# Patient Record
Sex: MALE | Race: WHITE | NOT HISPANIC OR LATINO | Employment: UNEMPLOYED | URBAN - NONMETROPOLITAN AREA
[De-identification: names, ages, dates, MRNs, and addresses within clinical notes are randomized per-mention and may not be internally consistent; named-entity substitution may affect disease eponyms.]

---

## 2018-04-13 ENCOUNTER — HOSPITAL ENCOUNTER (EMERGENCY)
Facility: HOSPITAL | Age: 33
Discharge: HOME | End: 2018-04-13
Attending: EMERGENCY MEDICINE | Admitting: EMERGENCY MEDICINE

## 2018-04-13 VITALS
HEART RATE: 71 BPM | WEIGHT: 190 LBS | OXYGEN SATURATION: 99 % | SYSTOLIC BLOOD PRESSURE: 142 MMHG | TEMPERATURE: 98.6 F | HEIGHT: 72 IN | DIASTOLIC BLOOD PRESSURE: 89 MMHG | BODY MASS INDEX: 25.73 KG/M2 | RESPIRATION RATE: 16 BRPM

## 2018-04-13 DIAGNOSIS — L23.7 POISON IVY DERMATITIS: Primary | ICD-10-CM

## 2018-04-13 PROCEDURE — 25010000002 METHYLPREDNISOLONE PER 125 MG: Performed by: PHYSICIAN ASSISTANT

## 2018-04-13 PROCEDURE — 96372 THER/PROPH/DIAG INJ SC/IM: CPT

## 2018-04-13 PROCEDURE — 99283 EMERGENCY DEPT VISIT LOW MDM: CPT

## 2018-04-13 RX ORDER — METHYLPREDNISOLONE SODIUM SUCCINATE 125 MG/2ML
125 INJECTION, POWDER, LYOPHILIZED, FOR SOLUTION INTRAMUSCULAR; INTRAVENOUS ONCE
Status: COMPLETED | OUTPATIENT
Start: 2018-04-13 | End: 2018-04-13

## 2018-04-13 RX ORDER — HYDROXYZINE HYDROCHLORIDE 25 MG/1
25 TABLET, FILM COATED ORAL EVERY 6 HOURS PRN
Qty: 24 TABLET | Refills: 0 | Status: ON HOLD | OUTPATIENT
Start: 2018-04-13 | End: 2018-06-12

## 2018-04-13 RX ADMIN — METHYLPREDNISOLONE SODIUM SUCCINATE 125 MG: 125 INJECTION, POWDER, FOR SOLUTION INTRAMUSCULAR; INTRAVENOUS at 09:38

## 2018-04-13 NOTE — ED NOTES
Pt drinking water, cool washcloth applied. Respirations even and unlabored. Pa remains at bedside.      Giovana Barber RN  04/13/18 0995

## 2018-04-13 NOTE — ED PROVIDER NOTES
Subjective   33-year-old male presents to the ED today and states that he has poison ivy.  He states he has been outside cutting trees.  He states the rash started about 3 days ago.  He states he has a history of an allergy to poison ivy.  He states he has been using calamine lotion and an over-the-counter poison ivy cream but states the rash continues to get worse.  The rash is itching and burning.  He states he has it all over his body.        History provided by:  Patient  Rash   Location:  Full body  Quality: burning and itchiness    Severity:  Severe  Onset quality:  Gradual  Duration:  3 days  Timing:  Constant  Progression:  Worsening  Chronicity:  New  Context: plant contact    Relieved by:  Nothing  Worsened by:  Nothing  Ineffective treatments: Calamine lotion and poison ivy cream.  Associated symptoms: no abdominal pain, no diarrhea, no fatigue, no fever, no headaches, no hoarse voice, no induration, no joint pain, no myalgias, no nausea, no periorbital edema, no shortness of breath, no sore throat, no throat swelling, no tongue swelling, no URI, not vomiting and not wheezing        Review of Systems   Constitutional: Negative for fatigue and fever.   HENT: Negative for facial swelling, hoarse voice, sore throat, trouble swallowing and voice change.    Eyes: Negative.    Respiratory: Negative for shortness of breath and wheezing.    Cardiovascular: Negative.    Gastrointestinal: Negative for abdominal pain, diarrhea, nausea and vomiting.   Genitourinary: Negative.    Musculoskeletal: Negative for arthralgias and myalgias.   Skin: Positive for rash.   Neurological: Negative for headaches.   Psychiatric/Behavioral: Negative.    All other systems reviewed and are negative.      Past Medical History:   Diagnosis Date   • Anxiety    • Bipolar disorder    • Depression    • PTSD (post-traumatic stress disorder)    • Thyroid disease        Allergies   Allergen Reactions   • Penicillins Anaphylaxis   • Benadryl  [Diphenhydramine] Rash       Past Surgical History:   Procedure Laterality Date   • MOUTH SURGERY         No family history on file.    Social History     Social History   • Marital status: Single     Social History Main Topics   • Smoking status: Current Every Day Smoker     Packs/day: 1.00     Types: Cigarettes   • Drug use: Unknown     Other Topics Concern   • Not on file           Objective   Physical Exam   Constitutional: He is oriented to person, place, and time. He appears well-developed and well-nourished. No distress.   HENT:   Head: Normocephalic and atraumatic.   Right Ear: External ear normal.   Left Ear: External ear normal.   Nose: Nose normal.   Mouth/Throat: Oropharynx is clear and moist. No oropharyngeal exudate.   Eyes: Conjunctivae and EOM are normal. Pupils are equal, round, and reactive to light.   Neck: Normal range of motion. Neck supple.   Cardiovascular: Normal rate, regular rhythm, normal heart sounds and intact distal pulses.    Pulmonary/Chest: Effort normal and breath sounds normal. No stridor. No respiratory distress. He has no wheezes.   Abdominal: Soft. Bowel sounds are normal. There is no tenderness.   Musculoskeletal: Normal range of motion. He exhibits no edema.   Lymphadenopathy:     He has no cervical adenopathy.   Neurological: He is alert and oriented to person, place, and time.   Skin: Skin is warm and dry. Capillary refill takes less than 2 seconds. Rash noted.   Patient has a diffuse rash, papules and small blisters that are weeping.  Areas of excoriation from scratching.   Psychiatric: He has a normal mood and affect. His behavior is normal. Judgment and thought content normal.   Nursing note and vitals reviewed.      Procedures         ED Course  ED Course   Comment By Time   Patient had a vasovagal reaction after receiving IM solu-medrol.  He became diaphoretic and pale.  He was able to drink some water and a cool cloth placed on his forehead.  Vitals remained normal.   He is currently feeling back to his normal self.  He was also prescribed a 2 week taper of Prednisone 20 mg.  He will return to the ED if his symptoms worsen. DEBORAH Crespo 04/13 0956                  MDM  Number of Diagnoses or Management Options  Poison ivy dermatitis:   Patient Progress  Patient progress: stable      Final diagnoses:   Poison ivy dermatitis            DEBORAH Crespo  04/13/18 0916

## 2018-04-13 NOTE — ED NOTES
Pt is pale, diaphoretic, reports sob. Pt led onto stretcher, side rails up x2. Pa at bedside.     Giovana Barber RN  04/13/18 4952

## 2018-04-13 NOTE — DISCHARGE INSTRUCTIONS
Call one of the offices below to establish a primary care provider.  If you are unable to get an appointment and feel it is an emergency and need to be seen immediately please return to the Emergency Department.    Call one of the office below to set up a primary care provider.    Dr. Yung Rios                                                                                                       [address] [facility] [address]  [phone]    Dr. Tanner, Dr. JEFERSON Pitt, Dr. MAYA Pitt ([facility])  [address] [facility] [address]  [phone]    Dr. Marie, Dr. Jones, Dr. Nguyen ([facility])  [address] [facility] [address]  [phone]    Dr. Morillo  [address] [facility] [address]  [phone]    Dr. Sharpe, Dr. Mir, Dr. Gillette, Dr. Puri ([facility])  [address] [place] [address] [address]  [facility] [address]  [phone]    Dr. Ayaka Saucedo  [address] [facility] [place] [address]  [phone]    Dr. Sheri Guzman  [address]   [address]  [facility] [address]  [phone]    Dr. Rios  [address] [facility] [address]  [phone]    Dr. Arias, Dr. Guzman  [address] [facility] [place] [address]  [phone]    Dr. Majano  [address] [facility] [address]                                                              [address]  [facility] [address]  [phone]    Dr. Brown  [address] [facility] [id number] [phone]    Dr. Isabella Landeros  [address]  [address]  [facility] [address]  [phone]

## 2018-04-13 NOTE — ED NOTES
Pt alert and oriented, skin is now pwd, no respiratory distress. Pt reports he feels better. Rash still present and is the same.      Giovana Barber RN  04/13/18 7783

## 2018-06-12 ENCOUNTER — HOSPITAL ENCOUNTER (INPATIENT)
Facility: HOSPITAL | Age: 33
LOS: 2 days | Discharge: HOME | End: 2018-06-14
Attending: PSYCHIATRY & NEUROLOGY | Admitting: PSYCHIATRY & NEUROLOGY

## 2018-06-12 ENCOUNTER — HOSPITAL ENCOUNTER (EMERGENCY)
Facility: HOSPITAL | Age: 33
Discharge: STILL ADMITTED OR ADMINISTRATIVE | End: 2018-06-12
Attending: FAMILY MEDICINE

## 2018-06-12 VITALS
OXYGEN SATURATION: 98 % | DIASTOLIC BLOOD PRESSURE: 83 MMHG | RESPIRATION RATE: 18 BRPM | WEIGHT: 172 LBS | TEMPERATURE: 98.1 F | HEART RATE: 87 BPM | SYSTOLIC BLOOD PRESSURE: 129 MMHG | BODY MASS INDEX: 24.08 KG/M2 | HEIGHT: 71 IN

## 2018-06-12 DIAGNOSIS — R45.851 SUICIDAL IDEATIONS: Primary | ICD-10-CM

## 2018-06-12 DIAGNOSIS — F19.10 POLYSUBSTANCE ABUSE (HCC): ICD-10-CM

## 2018-06-12 PROBLEM — F32.9 MDD (MAJOR DEPRESSIVE DISORDER): Status: ACTIVE | Noted: 2018-06-12

## 2018-06-12 LAB
6-ACETYL MORPHINE: NEGATIVE
ALBUMIN SERPL-MCNC: 4.2 G/DL (ref 3.5–5)
ALBUMIN/GLOB SERPL: 1.4 G/DL (ref 1.5–2.5)
ALP SERPL-CCNC: 126 U/L (ref 40–129)
ALT SERPL W P-5'-P-CCNC: 207 U/L (ref 10–44)
AMPHET+METHAMPHET UR QL: POSITIVE
ANION GAP SERPL CALCULATED.3IONS-SCNC: 2.1 MMOL/L (ref 3.6–11.2)
AST SERPL-CCNC: 130 U/L (ref 10–34)
BARBITURATES UR QL SCN: NEGATIVE
BASOPHILS # BLD AUTO: 0.08 10*3/MM3 (ref 0–0.3)
BASOPHILS NFR BLD AUTO: 0.9 % (ref 0–2)
BENZODIAZ UR QL SCN: NEGATIVE
BILIRUB SERPL-MCNC: 0.9 MG/DL (ref 0.2–1.8)
BILIRUB UR QL STRIP: ABNORMAL
BUN BLD-MCNC: 9 MG/DL (ref 7–21)
BUN/CREAT SERPL: 9.1 (ref 7–25)
BUPRENORPHINE SERPL-MCNC: POSITIVE NG/ML
CALCIUM SPEC-SCNC: 9.1 MG/DL (ref 7.7–10)
CANNABINOIDS SERPL QL: POSITIVE
CHLORIDE SERPL-SCNC: 108 MMOL/L (ref 99–112)
CLARITY UR: CLEAR
CO2 SERPL-SCNC: 29.9 MMOL/L (ref 24.3–31.9)
COCAINE UR QL: NEGATIVE
COLOR UR: ABNORMAL
CREAT BLD-MCNC: 0.99 MG/DL (ref 0.43–1.29)
DEPRECATED RDW RBC AUTO: 46.1 FL (ref 37–54)
EOSINOPHIL # BLD AUTO: 0.34 10*3/MM3 (ref 0–0.7)
EOSINOPHIL NFR BLD AUTO: 3.9 % (ref 0–5)
ERYTHROCYTE [DISTWIDTH] IN BLOOD BY AUTOMATED COUNT: 14.9 % (ref 11.5–14.5)
ETHANOL BLD-MCNC: <10 MG/DL
ETHANOL UR QL: <0.01 %
GFR SERPL CREATININE-BSD FRML MDRD: 87 ML/MIN/1.73
GLOBULIN UR ELPH-MCNC: 3 GM/DL
GLUCOSE BLD-MCNC: 77 MG/DL (ref 70–110)
GLUCOSE UR STRIP-MCNC: NEGATIVE MG/DL
HCT VFR BLD AUTO: 48.6 % (ref 42–52)
HGB BLD-MCNC: 16.3 G/DL (ref 14–18)
HGB UR QL STRIP.AUTO: NEGATIVE
IMM GRANULOCYTES # BLD: 0.02 10*3/MM3 (ref 0–0.03)
IMM GRANULOCYTES NFR BLD: 0.2 % (ref 0–0.5)
KETONES UR QL STRIP: NEGATIVE
LEUKOCYTE ESTERASE UR QL STRIP.AUTO: NEGATIVE
LYMPHOCYTES # BLD AUTO: 3.26 10*3/MM3 (ref 1–3)
LYMPHOCYTES NFR BLD AUTO: 37.5 % (ref 21–51)
MCH RBC QN AUTO: 28.3 PG (ref 27–33)
MCHC RBC AUTO-ENTMCNC: 33.5 G/DL (ref 33–37)
MCV RBC AUTO: 84.4 FL (ref 80–94)
METHADONE UR QL SCN: NEGATIVE
MONOCYTES # BLD AUTO: 1.13 10*3/MM3 (ref 0.1–0.9)
MONOCYTES NFR BLD AUTO: 13 % (ref 0–10)
NEUTROPHILS # BLD AUTO: 3.86 10*3/MM3 (ref 1.4–6.5)
NEUTROPHILS NFR BLD AUTO: 44.5 % (ref 30–70)
NITRITE UR QL STRIP: NEGATIVE
OPIATES UR QL: NEGATIVE
OSMOLALITY SERPL CALC.SUM OF ELEC: 276.9 MOSM/KG (ref 273–305)
OXYCODONE UR QL SCN: NEGATIVE
PCP UR QL SCN: NEGATIVE
PH UR STRIP.AUTO: <=5 [PH] (ref 5–8)
PLATELET # BLD AUTO: 317 10*3/MM3 (ref 130–400)
PMV BLD AUTO: 10.6 FL (ref 6–10)
POTASSIUM BLD-SCNC: 4.2 MMOL/L (ref 3.5–5.3)
PROT SERPL-MCNC: 7.2 G/DL (ref 6–8)
PROT UR QL STRIP: NEGATIVE
RBC # BLD AUTO: 5.76 10*6/MM3 (ref 4.7–6.1)
SODIUM BLD-SCNC: 140 MMOL/L (ref 135–153)
SP GR UR STRIP: >1.03 (ref 1–1.03)
UROBILINOGEN UR QL STRIP: ABNORMAL
WBC NRBC COR # BLD: 8.69 10*3/MM3 (ref 4.5–12.5)

## 2018-06-12 PROCEDURE — 80307 DRUG TEST PRSMV CHEM ANLYZR: CPT | Performed by: PHYSICIAN ASSISTANT

## 2018-06-12 PROCEDURE — 93005 ELECTROCARDIOGRAM TRACING: CPT | Performed by: PSYCHIATRY & NEUROLOGY

## 2018-06-12 PROCEDURE — 80053 COMPREHEN METABOLIC PANEL: CPT | Performed by: PHYSICIAN ASSISTANT

## 2018-06-12 PROCEDURE — 85025 COMPLETE CBC W/AUTO DIFF WBC: CPT | Performed by: PHYSICIAN ASSISTANT

## 2018-06-12 PROCEDURE — HZ2ZZZZ DETOXIFICATION SERVICES FOR SUBSTANCE ABUSE TREATMENT: ICD-10-PCS | Performed by: PSYCHIATRY & NEUROLOGY

## 2018-06-12 PROCEDURE — 81003 URINALYSIS AUTO W/O SCOPE: CPT | Performed by: PHYSICIAN ASSISTANT

## 2018-06-12 RX ORDER — FAMOTIDINE 20 MG/1
20 TABLET, FILM COATED ORAL 2 TIMES DAILY PRN
Status: DISCONTINUED | OUTPATIENT
Start: 2018-06-12 | End: 2018-06-14 | Stop reason: HOSPADM

## 2018-06-12 RX ORDER — HYDROXYZINE 50 MG/1
50 TABLET, FILM COATED ORAL EVERY 6 HOURS PRN
Status: DISCONTINUED | OUTPATIENT
Start: 2018-06-12 | End: 2018-06-14 | Stop reason: HOSPADM

## 2018-06-12 RX ORDER — ECHINACEA PURPUREA EXTRACT 125 MG
2 TABLET ORAL AS NEEDED
Status: DISCONTINUED | OUTPATIENT
Start: 2018-06-12 | End: 2018-06-14 | Stop reason: HOSPADM

## 2018-06-12 RX ORDER — TRAZODONE HYDROCHLORIDE 50 MG/1
50 TABLET ORAL NIGHTLY PRN
Status: DISCONTINUED | OUTPATIENT
Start: 2018-06-12 | End: 2018-06-14 | Stop reason: HOSPADM

## 2018-06-12 RX ORDER — BENZTROPINE MESYLATE 1 MG/1
1 TABLET ORAL DAILY PRN
Status: DISCONTINUED | OUTPATIENT
Start: 2018-06-12 | End: 2018-06-14 | Stop reason: HOSPADM

## 2018-06-12 RX ORDER — IBUPROFEN 600 MG/1
600 TABLET ORAL EVERY 6 HOURS PRN
Status: DISCONTINUED | OUTPATIENT
Start: 2018-06-12 | End: 2018-06-14 | Stop reason: HOSPADM

## 2018-06-12 RX ORDER — LOPERAMIDE HYDROCHLORIDE 2 MG/1
2 CAPSULE ORAL 4 TIMES DAILY PRN
Status: DISCONTINUED | OUTPATIENT
Start: 2018-06-12 | End: 2018-06-14 | Stop reason: HOSPADM

## 2018-06-12 RX ORDER — BENZTROPINE MESYLATE 1 MG/ML
0.5 INJECTION INTRAMUSCULAR; INTRAVENOUS DAILY PRN
Status: DISCONTINUED | OUTPATIENT
Start: 2018-06-12 | End: 2018-06-14 | Stop reason: HOSPADM

## 2018-06-12 RX ORDER — BENZONATATE 100 MG/1
100 CAPSULE ORAL 3 TIMES DAILY PRN
Status: DISCONTINUED | OUTPATIENT
Start: 2018-06-12 | End: 2018-06-14 | Stop reason: HOSPADM

## 2018-06-12 RX ORDER — ALUMINA, MAGNESIA, AND SIMETHICONE 2400; 2400; 240 MG/30ML; MG/30ML; MG/30ML
15 SUSPENSION ORAL EVERY 6 HOURS PRN
Status: DISCONTINUED | OUTPATIENT
Start: 2018-06-12 | End: 2018-06-14 | Stop reason: HOSPADM

## 2018-06-12 RX ORDER — ONDANSETRON 4 MG/1
4 TABLET, FILM COATED ORAL EVERY 6 HOURS PRN
Status: DISCONTINUED | OUTPATIENT
Start: 2018-06-12 | End: 2018-06-14 | Stop reason: HOSPADM

## 2018-06-12 RX ORDER — NICOTINE 21 MG/24HR
1 PATCH, TRANSDERMAL 24 HOURS TRANSDERMAL EVERY 24 HOURS
Status: DISCONTINUED | OUTPATIENT
Start: 2018-06-13 | End: 2018-06-14 | Stop reason: HOSPADM

## 2018-06-12 NOTE — ED PROVIDER NOTES
Subjective   Suicidal for 2 days   Detox from benzos meth and suboxone         History provided by:  Patient   used: No    Mental Health Problem   Presenting symptoms: suicidal thoughts    Degree of incapacity (severity):  Moderate  Onset quality:  Sudden  Duration:  2 days  Timing:  Constant  Progression:  Worsening  Chronicity:  New  Context: drug abuse    Time since last psychoactive medication taken:  2 days  Worsened by:  Drugs  Associated symptoms: feelings of worthlessness    Associated symptoms: no anxiety, no chest pain and no headaches    Risk factors: hx of mental illness        Review of Systems   Constitutional: Negative for chills and fever.   HENT: Negative for congestion, ear pain and sore throat.    Respiratory: Negative for cough, shortness of breath and wheezing.    Cardiovascular: Negative for chest pain.   Gastrointestinal: Negative for diarrhea, nausea and vomiting.   Genitourinary: Negative for dysuria and flank pain.   Skin: Negative for rash.   Neurological: Negative for headaches.   Psychiatric/Behavioral: Positive for dysphoric mood and suicidal ideas. The patient is not nervous/anxious.    All other systems reviewed and are negative.      Past Medical History:   Diagnosis Date   • Anxiety    • Bipolar disorder    • Depression    • Hyperthyroidism    • Liver disease    • PTSD (post-traumatic stress disorder)    • Substance abuse    • Suicidal thoughts    • Suicide attempt    • Thyroid disease        Allergies   Allergen Reactions   • Penicillins Anaphylaxis and Rash   • Benadryl [Diphenhydramine] Rash       Past Surgical History:   Procedure Laterality Date   • MOUTH SURGERY         Family History   Problem Relation Age of Onset   • Alcohol abuse Father    • No Known Problems Mother    • No Known Problems Sister    • No Known Problems Brother    • No Known Problems Sister    • No Known Problems Sister    • No Known Problems Brother    • No Known Problems Brother         Social History     Social History   • Marital status: Single     Social History Main Topics   • Smoking status: Current Every Day Smoker     Packs/day: 1.00     Years: 22.00     Types: Cigarettes   • Smokeless tobacco: Never Used   • Alcohol use No   • Drug use: Yes     Types: Benzodiazepines, Methamphetamines, Marijuana, Other      Comment: states uses benzos, eith pepe, xanax, or klonopin , whatever available; 3-4 x wk po ; states today he took 2,  1 mg klonopins   • Sexual activity: Defer     Other Topics Concern   • Not on file           Objective   Physical Exam   Constitutional: He is oriented to person, place, and time. He appears well-developed and well-nourished.   HENT:   Head: Normocephalic.   Mouth/Throat: Oropharynx is clear and moist.   Neck: Neck supple.   Cardiovascular: Normal rate and regular rhythm.    Pulmonary/Chest: Effort normal and breath sounds normal.   Abdominal: Soft. Bowel sounds are normal. There is no tenderness.   Musculoskeletal: Normal range of motion.   Neurological: He is alert and oriented to person, place, and time.   Skin: Skin is warm and dry.   Psychiatric: His behavior is normal. Judgment normal. He exhibits a depressed mood. He expresses suicidal ideation.   Nursing note and vitals reviewed.      Procedures           ED Course  ED Course as of Chetan 15 0023   Tue Jun 12, 2018 2003 Report given to ivan   [DREW]   2242 Assumed care from PEDRO Newell - will admit to psych  [ML]      ED Course User Index  [LC] DEBORAH De La Rosa  [ML] DEBORAH Johnson                  [facility]      Final diagnoses:   Suicidal ideations   Polysubstance abuse            DEBORAH De La Rosa  06/15/18 0023

## 2018-06-13 PROBLEM — F11.23 OPIOID DEPENDENCE WITH WITHDRAWAL: Status: ACTIVE | Noted: 2018-06-13

## 2018-06-13 PROBLEM — F15.20 METHAMPHETAMINE USE DISORDER, MODERATE (HCC): Status: ACTIVE | Noted: 2018-06-13

## 2018-06-13 PROBLEM — F33.2 SEVERE EPISODE OF RECURRENT MAJOR DEPRESSIVE DISORDER, WITHOUT PSYCHOTIC FEATURES: Status: ACTIVE | Noted: 2018-06-12

## 2018-06-13 PROBLEM — F43.10 POST TRAUMATIC STRESS DISORDER (PTSD): Status: ACTIVE | Noted: 2018-06-13

## 2018-06-13 LAB
HAV IGM SERPL QL IA: ABNORMAL
HBV CORE IGM SERPL QL IA: ABNORMAL
HBV SURFACE AG SERPL QL IA: ABNORMAL
HCV AB SER DONR QL: REACTIVE
HIV1+2 AB SER QL: NORMAL

## 2018-06-13 PROCEDURE — 99223 1ST HOSP IP/OBS HIGH 75: CPT | Performed by: PSYCHIATRY & NEUROLOGY

## 2018-06-13 PROCEDURE — 80074 ACUTE HEPATITIS PANEL: CPT | Performed by: PSYCHIATRY & NEUROLOGY

## 2018-06-13 PROCEDURE — 93010 ELECTROCARDIOGRAM REPORT: CPT | Performed by: INTERNAL MEDICINE

## 2018-06-13 PROCEDURE — G0432 EIA HIV-1/HIV-2 SCREEN: HCPCS | Performed by: PSYCHIATRY & NEUROLOGY

## 2018-06-13 RX ORDER — RISPERIDONE 1 MG/1
1 TABLET ORAL EVERY 12 HOURS SCHEDULED
Status: DISCONTINUED | OUTPATIENT
Start: 2018-06-13 | End: 2018-06-14 | Stop reason: HOSPADM

## 2018-06-13 RX ORDER — PAROXETINE HYDROCHLORIDE 20 MG/1
10 TABLET, FILM COATED ORAL DAILY
Status: DISCONTINUED | OUTPATIENT
Start: 2018-06-13 | End: 2018-06-14 | Stop reason: HOSPADM

## 2018-06-13 RX ORDER — BUPRENORPHINE 2 MG/1
4 TABLET SUBLINGUAL ONCE
Status: COMPLETED | OUTPATIENT
Start: 2018-06-13 | End: 2018-06-13

## 2018-06-13 RX ORDER — LORAZEPAM 1 MG/1
1 TABLET ORAL ONCE
Status: DISCONTINUED | OUTPATIENT
Start: 2018-06-13 | End: 2018-06-13

## 2018-06-13 RX ORDER — BUSPIRONE HYDROCHLORIDE 5 MG/1
5 TABLET ORAL EVERY 12 HOURS SCHEDULED
Status: DISCONTINUED | OUTPATIENT
Start: 2018-06-13 | End: 2018-06-14 | Stop reason: HOSPADM

## 2018-06-13 RX ORDER — BUPRENORPHINE 2 MG/1
2 TABLET SUBLINGUAL ONCE
Status: COMPLETED | OUTPATIENT
Start: 2018-06-14 | End: 2018-06-14

## 2018-06-13 RX ADMIN — HYDROXYZINE HYDROCHLORIDE 50 MG: 50 TABLET ORAL at 21:25

## 2018-06-13 RX ADMIN — PAROXETINE HYDROCHLORIDE 10 MG: 20 TABLET, FILM COATED ORAL at 16:57

## 2018-06-13 RX ADMIN — BUPRENORPHINE HCL 4 MG: 2 TABLET SUBLINGUAL at 16:57

## 2018-06-13 RX ADMIN — TRAZODONE HYDROCHLORIDE 50 MG: 50 TABLET ORAL at 21:25

## 2018-06-13 RX ADMIN — RISPERIDONE 1 MG: 1 TABLET, FILM COATED ORAL at 21:25

## 2018-06-13 RX ADMIN — BUSPIRONE HYDROCHLORIDE 5 MG: 5 TABLET ORAL at 21:25

## 2018-06-13 NOTE — PLAN OF CARE
Problem: Patient Care Overview  Goal: Plan of Care Review  Outcome: Ongoing (interventions implemented as appropriate)  Pt irritable and agitated today. Pt reports poor sleep and fair appetite. Pt has isolated himself in his room most of the day. Rates A/D 10/10. Denies SI/HI or hallucinations. Reports feeling helpless,hopeless and worthless.   06/13/18 1642   Coping/Psychosocial   Plan of Care Reviewed With patient   Coping/Psychosocial   Patient Agreement with Plan of Care agrees   Plan of Care Review   Progress no change     Goal: Individualization and Mutuality  Outcome: Ongoing (interventions implemented as appropriate)    Goal: Discharge Needs Assessment  Outcome: Ongoing (interventions implemented as appropriate)    Goal: Interprofessional Rounds/Family Conf  Outcome: Ongoing (interventions implemented as appropriate)      Problem: Overarching Goals (Adult)  Goal: Adheres to Safety Considerations for Self and Others  Outcome: Ongoing (interventions implemented as appropriate)    Goal: Optimized Coping Skills in Response to Life Stressors  Outcome: Ongoing (interventions implemented as appropriate)    Goal: Develops/Participates in Therapeutic [place] to Support Successful Transition  Outcome: Ongoing (interventions implemented as appropriate)
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
## 2018-06-13 NOTE — ED PROVIDER NOTES
Subjective   History of Present Illness    Review of Systems    Past Medical History:   Diagnosis Date   • Anxiety    • Bipolar disorder    • Depression    • Hyperthyroidism    • Liver disease    • PTSD (post-traumatic stress disorder)    • Substance abuse    • Suicide attempt    • Thyroid disease        Allergies   Allergen Reactions   • Penicillins Anaphylaxis and Rash   • Benadryl [Diphenhydramine] Rash       Past Surgical History:   Procedure Laterality Date   • MOUTH SURGERY         Family History   Problem Relation Age of Onset   • Alcohol abuse Father        Social History     Social History   • Marital status: Single     Social History Main Topics   • Smoking status: Current Every Day Smoker     Packs/day: 1.00     Types: Cigarettes   • Drug use: Yes     Types: Benzodiazepines, Methamphetamines, Marijuana, Other      Comment: states uses benzos, eith pepe, xanax, or klonopin , whatever available; 3-4 x wk po ; states today he took 2,  1 mg klonopins   • Sexual activity: Defer     Other Topics Concern   • Not on file           Objective   Physical Exam    Procedures           ED Course  ED Course as of Jun 12 2242   Tue Jun 12, 2018 2003 Report given to ivan   [LC]   2242 Assumed care from PEDRO Newell - will admit to psych  [ML]      ED Course User Index  [LC] DEBORAH De La Rosa  [ML] DEBORAH Johnson                  [facility]      Final diagnoses:   Suicidal ideations   Polysubstance abuse            DEBORAH Johnson  06/12/18 2242

## 2018-06-13 NOTE — PLAN OF CARE
Problem: Patient Care Overview  Goal: Plan of Care Review  Outcome: Ongoing (interventions implemented as appropriate)   06/13/18 0100   Coping/Psychosocial   Plan of Care Reviewed With patient   Coping/Psychosocial   Patient Agreement with Plan of Care agrees   Plan of Care Review   Progress no change       Problem: Overarching Goals (Adult)  Goal: Adheres to Safety Considerations for Self and Others  Outcome: Ongoing (interventions implemented as appropriate)    Goal: Optimized Coping Skills in Response to Life Stressors  Outcome: Ongoing (interventions implemented as appropriate)    Goal: Develops/Participates in Therapeutic [place] to Support Successful Transition  Outcome: Ongoing (interventions implemented as appropriate)
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
## 2018-06-13 NOTE — PROGRESS NOTES
Navigator is assisting with discharge planning. Provided patient with a list of suboxone clinics in the [facility]. The clinics usually require patient to call to complete assessment before scheduling appt.

## 2018-06-14 VITALS
DIASTOLIC BLOOD PRESSURE: 58 MMHG | RESPIRATION RATE: 18 BRPM | WEIGHT: 169.2 LBS | HEART RATE: 83 BPM | BODY MASS INDEX: 25.06 KG/M2 | SYSTOLIC BLOOD PRESSURE: 126 MMHG | OXYGEN SATURATION: 99 % | TEMPERATURE: 97.4 F | HEIGHT: 69 IN

## 2018-06-14 PROCEDURE — 99238 HOSP IP/OBS DSCHRG MGMT 30/<: CPT | Performed by: PSYCHIATRY & NEUROLOGY

## 2018-06-14 RX ORDER — TRIHEXYPHENIDYL HYDROCHLORIDE 2 MG/1
2 TABLET ORAL 2 TIMES DAILY WITH MEALS
Qty: 60 TABLET | Refills: 0 | Status: ON HOLD | OUTPATIENT
Start: 2018-06-14 | End: 2022-05-05

## 2018-06-14 RX ORDER — ROPINIROLE 0.25 MG/1
0.25 TABLET, FILM COATED ORAL 3 TIMES DAILY
Qty: 21 TABLET | Refills: 0 | Status: SHIPPED | OUTPATIENT
Start: 2018-06-14 | End: 2018-06-21

## 2018-06-14 RX ORDER — RISPERIDONE 1 MG/1
1 TABLET ORAL EVERY 12 HOURS SCHEDULED
Qty: 30 TABLET | Refills: 0 | Status: ON HOLD | OUTPATIENT
Start: 2018-06-14 | End: 2022-05-05

## 2018-06-14 RX ORDER — PAROXETINE HYDROCHLORIDE 20 MG/1
20 TABLET, FILM COATED ORAL DAILY
Qty: 30 TABLET | Refills: 0 | Status: ON HOLD | OUTPATIENT
Start: 2018-06-15 | End: 2022-05-05

## 2018-06-14 RX ORDER — CYCLOBENZAPRINE HCL 10 MG
10 TABLET ORAL EVERY 8 HOURS PRN
Qty: 21 TABLET | Refills: 0 | Status: SHIPPED | OUTPATIENT
Start: 2018-06-14 | End: 2018-06-21

## 2018-06-14 RX ORDER — BUSPIRONE HYDROCHLORIDE 10 MG/1
10 TABLET ORAL EVERY 12 HOURS SCHEDULED
Qty: 60 TABLET | Refills: 0 | Status: ON HOLD | OUTPATIENT
Start: 2018-06-14 | End: 2022-05-05

## 2018-06-14 RX ADMIN — PAROXETINE HYDROCHLORIDE 10 MG: 20 TABLET, FILM COATED ORAL at 09:53

## 2018-06-14 RX ADMIN — RISPERIDONE 1 MG: 1 TABLET, FILM COATED ORAL at 09:53

## 2018-06-14 RX ADMIN — BUSPIRONE HYDROCHLORIDE 5 MG: 5 TABLET ORAL at 09:53

## 2018-06-14 RX ADMIN — BUPRENORPHINE HCL 2 MG: 2 TABLET SUBLINGUAL at 09:53

## 2018-06-14 NOTE — DISCHARGE INSTR - APPOINTMENTS
Please attend: [facility] [address] [place] [address]                           Phone: [phone]    You have an appointment on Friday June 15th, 2018 at 10:00 for intake.

## 2018-06-14 NOTE — DISCHARGE SUMMARY
PSYCHIATRIC DISCHARGE SUMMARY     Patient Identification:  Name:  aJbari Telles  Age:  33 y.o.  Sex:  male  :  1985  MRN:  [id number]  Visit Number:  [id number]      Date of Admission:2018   Date of Discharge:  2018    Discharge Diagnosis:  Principal Problem:    Severe episode of recurrent major depressive disorder, without psychotic features  Active Problems:    Post traumatic stress disorder (PTSD)    Opioid dependence with withdrawal    Methamphetamine use disorder, moderate        Admission Diagnosis:  MDD (major depressive disorder) [F32.9]     Hospital Course  Patient is a 33 y.o. male presented with suicidal thoughts with a plan to hang himself.  The patient was also in acute withdrawal from opioids and methamphetamines.  He was admitted for safety and stabilization.  Initially on the unit he was very irritable and initially requested to leave.  However after addressing his acute opioid withdrawal with a short Subutex taper and supportive medications, the patient mood had improved significantly.  The patient had a history of being on Paxil, BuSpar, risperidone and Artane while incarcerated and found these very helpful.  I agreed to restart them.  He was started on Paxil 10 mg daily, BuSpar 5 mg twice a day, risperidone 1 mg twice a day.  On the day of discharge Artane was added along with increased doses of Paxil and BuSpar.  On the second hospital day, the patient reported resolution of withdrawal symptoms.  We discussed that he likely would have continued withdrawal symptoms at discharge since he received Subutex on the unit.  He was given a 7 day supply of Flexeril and Requip to help with opioid withdrawal symptoms over the next several days.  The patient did not act out aggressively nor engage in any self-harm behaviors on the unit.  He was felt stable for discharge.  Safety planning was completed with his cousin with whom he lives prior to discharge.    Mental Status Exam upon  "discharge:   Mood \"better\"   Affect-congruent, appropriate, stable  Thought Content-goal directed, no delusional material present  Thought process-linear, organized.  Suicidality: No SI  Homicidality: No HI  Perception: No AH/VH    Procedures Performed         Consults:   Consults     No orders found from 5/14/2018 to 6/13/2018.          Pertinent Test Results:   Results for EVELIO RICE (MRN [id number]) as of 6/14/2018 11:33   Ref. Range 6/12/2018 20:14 6/12/2018 20:15 6/13/2018 01:16 6/13/2018 05:06   Glucose Latest Ref Range: 70 - 110 mg/dL 77      Sodium Latest Ref Range: 135 - 153 mmol/L 140      Potassium Latest Ref Range: 3.5 - 5.3 mmol/L 4.2      CO2 Latest Ref Range: 24.3 - 31.9 mmol/L 29.9      Chloride Latest Ref Range: 99 - 112 mmol/L 108      Anion Gap Latest Ref Range: 3.6 - 11.2 mmol/L 2.1 (L)      Creatinine Latest Ref Range: 0.43 - 1.29 mg/dL 0.99      BUN Latest Ref Range: 7 - 21 mg/dL 9      BUN/Creatinine Ratio Latest Ref Range: 7.0 - 25.0  9.1      Calcium Latest Ref Range: 7.7 - 10.0 mg/dL 9.1      eGFR Non African Amer Latest Ref Range: >60 mL/min/1.73 87      Alkaline Phosphatase Latest Ref Range: 40 - 129 U/L 126      Total Protein Latest Ref Range: 6.0 - 8.0 g/dL 7.2      ALT (SGPT) Latest Ref Range: 10 - 44 U/L 207 (H)      AST (SGOT) Latest Ref Range: 10 - 34 U/L 130 (H)      Total Bilirubin Latest Ref Range: 0.2 - 1.8 mg/dL 0.9      Albumin Latest Ref Range: 3.50 - 5.00 g/dL 4.20      Globulin Latest Units: gm/dL 3.0      A/G Ratio Latest Ref Range: 1.5 - 2.5 g/dL 1.4 (L)      Osmolality Calc Latest Ref Range: 273.0 - 305.0 mOsm/kg 276.9      WBC Latest Ref Range: 4.50 - 12.50 10*3/mm3 8.69      RBC Latest Ref Range: 4.70 - 6.10 10*6/mm3 5.76      Hemoglobin Latest Ref Range: 14.0 - 18.0 g/dL 16.3      Hematocrit Latest Ref Range: 42.0 - 52.0 % 48.6      RDW Latest Ref Range: 11.5 - 14.5 % 14.9 (H)      MCV Latest Ref Range: 80.0 - 94.0 fL 84.4      MCH Latest Ref Range: 27.0 - 33.0 pg " 28.3      MCHC Latest Ref Range: 33.0 - 37.0 g/dL 33.5      MPV Latest Ref Range: 6.0 - 10.0 fL 10.6 (H)      Platelets Latest Ref Range: 130 - 400 10*3/mm3 317      RDW-SD Latest Ref Range: 37.0 - 54.0 fl 46.1      Neutrophil % Latest Ref Range: 30.0 - 70.0 % 44.5      Lymphocyte % Latest Ref Range: 21.0 - 51.0 % 37.5      Monocyte % Latest Ref Range: 0.0 - 10.0 % 13.0 (H)      Eosinophil % Latest Ref Range: 0.0 - 5.0 % 3.9      Basophil % Latest Ref Range: 0.0 - 2.0 % 0.9      Immature Grans % Latest Ref Range: 0.0 - 0.5 % 0.2      Neutrophils, Absolute Latest Ref Range: 1.40 - 6.50 10*3/mm3 3.86      Lymphocytes, Absolute Latest Ref Range: 1.00 - 3.00 10*3/mm3 3.26 (H)      Monocytes, Absolute Latest Ref Range: 0.10 - 0.90 10*3/mm3 1.13 (H)      Eosinophils, Absolute Latest Ref Range: 0.00 - 0.70 10*3/mm3 0.34      Basophils, Absolute Latest Ref Range: 0.00 - 0.30 10*3/mm3 0.08      Immature Grans, Absolute Latest Ref Range: 0.00 - 0.03 10*3/mm3 0.02      Hep A IgM Latest Ref Range: Non-Reactive     Non-Reactive   Hepatitis B Surface Ag Latest Ref Range: Non-Reactive     Non-Reactive   Hep B C IgM Latest Ref Range: Non-Reactive     Non-Reactive   Hepatitis C Ab Latest Ref Range: Non-Reactive     Reactive (A)   HIV-1/ HIV-2 Latest Ref Range: Non-Reactive     Non-Reactive   Color, UA Latest Ref Range: Yellow, Straw   Dark Yellow (A)     Appearance, UA Latest Ref Range: Clear   Clear     Specific [place], UA Latest Ref Range: 1.005 - 1.030   >1.030 (H)     pH, UA Latest Ref Range: 5.0 - 8.0   <=5.0     Glucose, UA Latest Ref Range: Negative   Negative     Ketones, UA Latest Ref Range: Negative   Negative     Blood, UA Latest Ref Range: Negative   Negative     Nitrite, UA Latest Ref Range: Negative   Negative     Leuk Esterase, UA Latest Ref Range: Negative   Negative     Protein, UA Latest Ref Range: Negative   Negative     Bilirubin, UA Latest Ref Range: Negative   Small (1+) (A)     Urobilinogen, UA Latest Ref  Range: 0.2 - 1.0 E.U./dL   1.0 E.U./dL     Ethanol % Latest Units: % <0.010      Ethanol Latest Ref Range: <=10 mg/dL <10      6-ACETYL MORPHINE Latest Ref Range: Negative   Negative     Amphetamine Screen, Urine Latest Ref Range: Negative   Positive (A)     Barbiturates Screen, Urine Latest Ref Range: Negative   Negative     Benzodiazepine Screen, Urine Latest Ref Range: Negative   Negative     Buprenorphine, Screen, Urine Latest Ref Range: Negative   Positive (A)     Cocaine Screen, Urine Latest Ref Range: Negative   Negative     Methadone Screen , Urine Latest Ref Range: Negative   Negative     Opiate Screen, Urine Latest Ref Range: Negative   Negative     Oxycodone Screen, Urine Latest Ref Range: Negative   Negative     Phencyclidine (PCP), Urine Latest Ref Range: Negative   Negative     THC Screen, Urine Latest Ref Range: Negative   Positive (A)       Condition on Discharge:  stable    Vital Signs  Temp:  [97.3 °F (36.3 °C)-98.2 °F (36.8 °C)] 97.4 °F (36.3 °C)  Heart Rate:  [48-67] 62  Resp:  [16-20] 18  BP: ()/(50-78) 122/66      Discharge Disposition:  Home or Self Care    Discharge Medications:     Discharge Medications      New Medications      Instructions Start Date   busPIRone 10 MG tablet  Commonly known as:  BUSPAR   10 mg, Oral, Every 12 Hours Scheduled      cyclobenzaprine 10 MG tablet  Commonly known as:  FLEXERIL   10 mg, Oral, Every 8 Hours PRN      PARoxetine 20 MG tablet  Commonly known as:  PAXIL   20 mg, Oral, Daily   Start Date:  6/15/2018     risperiDONE 1 MG tablet  Commonly known as:  risperDAL   1 mg, Oral, Every 12 Hours Scheduled      rOPINIRole 0.25 MG tablet  Commonly known as:  REQUIP   0.25 mg, Oral, 3 Times Daily, Take 1 hour before bedtime.      trihexyphenidyl 2 MG tablet  Commonly known as:  ARTANE   2 mg, Oral, 2 Times Daily With Meals             Discharge Diet: regular     Activity at Discharge: as tolerated    Follow-up Appointments  Comp care in [facility]  Results Pending at Discharge      Clinician:   Orestes Fortune MD  06/14/18  11:32 AM

## 2018-06-14 NOTE — PLAN OF CARE
Problem: Patient Care Overview  Goal: Interprofessional Rounds/Family Conf  Outcome: Outcome(s) achieved Date Met: 06/14/18 06/14/18 1214   Interdisciplinary Rounds/Family Conf   Summary Discussed patient with Dr Fortune this morning, met with patient and contacted family.   Interdisciplinary Rounds/Family Conf   Participants family;social work;patient;psychiatrist     DATA: Discussed with Dr. Fortune that patient is requesting to discharge today.  Discussed that patient appears stable for discharge.  Discussed that patient will follow up with [facility].  Patient was agreeable to this plan and signed consent for [facility].  Met with patient and conducted phone family session with patient and his cousin Tomasa.  She reported that patient will not have access to firearms in the home and will be safe in her home.  She reported that she is on her way and will stop to pick patient up.  Patient verbalized understanding of the importance of aftercare and is agreeable to attend [facility].    ASSESSMENT:  Patient denies suicidal ideation today and denies homicidal ideation today. Patient reports feeling much better today with decreased depression and anxiety.  Patients family appears concerned and supportive.    PLAN:  Patient will discharge home with his cousin today and will follow up with [facility] in [place] tomorrow.

## 2018-06-14 NOTE — PLAN OF CARE
Problem: Patient Care Overview  Goal: Plan of Care Review  Outcome: Ongoing (interventions implemented as appropriate)   06/14/18 0459   Coping/Psychosocial   Plan of Care Reviewed With patient   Coping/Psychosocial   Patient Agreement with Plan of Care agrees   Plan of Care Review   Progress improving   OTHER   Outcome Summary Pt. is stable and slept all night. Reports anx/depr 5/5. Craving 0. WD symptoms anxiety and depression. Cooperative but withdrawn. Denies hallucinations. Denies SI and HI.

## 2018-10-19 ENCOUNTER — APPOINTMENT (OUTPATIENT)
Dept: CT IMAGING | Facility: HOSPITAL | Age: 33
End: 2018-10-19

## 2018-10-19 ENCOUNTER — HOSPITAL ENCOUNTER (EMERGENCY)
Facility: HOSPITAL | Age: 33
Discharge: HOME | End: 2018-10-19
Attending: EMERGENCY MEDICINE

## 2018-10-19 VITALS
DIASTOLIC BLOOD PRESSURE: 80 MMHG | WEIGHT: 180 LBS | RESPIRATION RATE: 18 BRPM | HEART RATE: 97 BPM | TEMPERATURE: 98.2 F | BODY MASS INDEX: 25.77 KG/M2 | HEIGHT: 70 IN | OXYGEN SATURATION: 98 % | SYSTOLIC BLOOD PRESSURE: 136 MMHG

## 2018-10-19 DIAGNOSIS — K52.9 GASTROENTERITIS: Primary | ICD-10-CM

## 2018-10-19 DIAGNOSIS — N39.0 ACUTE UTI: ICD-10-CM

## 2018-10-19 LAB
6-ACETYL MORPHINE: NEGATIVE
ALBUMIN SERPL-MCNC: 4.9 G/DL (ref 3.5–5)
ALBUMIN/GLOB SERPL: 1.5 G/DL (ref 1.5–2.5)
ALP SERPL-CCNC: 122 U/L (ref 40–129)
ALT SERPL W P-5'-P-CCNC: 87 U/L (ref 10–44)
AMPHET+METHAMPHET UR QL: POSITIVE
ANION GAP SERPL CALCULATED.3IONS-SCNC: 4.4 MMOL/L (ref 3.6–11.2)
AST SERPL-CCNC: 45 U/L (ref 10–34)
BACTERIA UR QL AUTO: ABNORMAL /HPF
BARBITURATES UR QL SCN: NEGATIVE
BASOPHILS # BLD AUTO: 0.09 10*3/MM3 (ref 0–0.3)
BASOPHILS NFR BLD AUTO: 0.4 % (ref 0–2)
BENZODIAZ UR QL SCN: NEGATIVE
BILIRUB SERPL-MCNC: 1.2 MG/DL (ref 0.2–1.8)
BILIRUB UR QL STRIP: ABNORMAL
BUN BLD-MCNC: 17 MG/DL (ref 7–21)
BUN/CREAT SERPL: 14.7 (ref 7–25)
BUPRENORPHINE SERPL-MCNC: POSITIVE NG/ML
CALCIUM SPEC-SCNC: 9.9 MG/DL (ref 7.7–10)
CANNABINOIDS SERPL QL: NEGATIVE
CHLORIDE SERPL-SCNC: 104 MMOL/L (ref 99–112)
CLARITY UR: CLEAR
CO2 SERPL-SCNC: 28.6 MMOL/L (ref 24.3–31.9)
COCAINE UR QL: NEGATIVE
COLOR UR: ABNORMAL
CREAT BLD-MCNC: 1.16 MG/DL (ref 0.43–1.29)
D-LACTATE SERPL-SCNC: 1.8 MMOL/L (ref 0.5–2)
DEPRECATED RDW RBC AUTO: 46.1 FL (ref 37–54)
EOSINOPHIL # BLD AUTO: 0.11 10*3/MM3 (ref 0–0.7)
EOSINOPHIL NFR BLD AUTO: 0.5 % (ref 0–5)
ERYTHROCYTE [DISTWIDTH] IN BLOOD BY AUTOMATED COUNT: 14.5 % (ref 11.5–14.5)
ETHANOL BLD-MCNC: <10 MG/DL
ETHANOL UR QL: <0.01 %
FLUAV AG NPH QL: NEGATIVE
FLUBV AG NPH QL IA: NEGATIVE
GFR SERPL CREATININE-BSD FRML MDRD: 72 ML/MIN/1.73
GLOBULIN UR ELPH-MCNC: 3.3 GM/DL
GLUCOSE BLD-MCNC: 89 MG/DL (ref 70–110)
GLUCOSE UR STRIP-MCNC: NEGATIVE MG/DL
HAV IGM SERPL QL IA: ABNORMAL
HBV CORE IGM SERPL QL IA: ABNORMAL
HBV SURFACE AG SERPL QL IA: ABNORMAL
HCT VFR BLD AUTO: 49.4 % (ref 42–52)
HCV AB SER DONR QL: REACTIVE
HGB BLD-MCNC: 17.1 G/DL (ref 14–18)
HGB UR QL STRIP.AUTO: NEGATIVE
HIV1+2 AB SER QL: NORMAL
HYALINE CASTS UR QL AUTO: ABNORMAL /LPF
IMM GRANULOCYTES # BLD: 0.07 10*3/MM3 (ref 0–0.03)
IMM GRANULOCYTES NFR BLD: 0.3 % (ref 0–0.5)
INR PPP: 1.07 (ref 0.9–1.1)
KETONES UR QL STRIP: ABNORMAL
LEUKOCYTE ESTERASE UR QL STRIP.AUTO: ABNORMAL
LIPASE SERPL-CCNC: 40 U/L (ref 13–60)
LYMPHOCYTES # BLD AUTO: 1.87 10*3/MM3 (ref 1–3)
LYMPHOCYTES NFR BLD AUTO: 9.3 % (ref 21–51)
MCH RBC QN AUTO: 29.8 PG (ref 27–33)
MCHC RBC AUTO-ENTMCNC: 34.6 G/DL (ref 33–37)
MCV RBC AUTO: 86.2 FL (ref 80–94)
METHADONE UR QL SCN: NEGATIVE
MONOCYTES # BLD AUTO: 2.58 10*3/MM3 (ref 0.1–0.9)
MONOCYTES NFR BLD AUTO: 12.8 % (ref 0–10)
NEUTROPHILS # BLD AUTO: 15.49 10*3/MM3 (ref 1.4–6.5)
NEUTROPHILS NFR BLD AUTO: 76.7 % (ref 30–70)
NITRITE UR QL STRIP: POSITIVE
OPIATES UR QL: NEGATIVE
OSMOLALITY SERPL CALC.SUM OF ELEC: 274.8 MOSM/KG (ref 273–305)
OXYCODONE UR QL SCN: NEGATIVE
PCP UR QL SCN: NEGATIVE
PH UR STRIP.AUTO: <=5 [PH] (ref 5–8)
PLATELET # BLD AUTO: 375 10*3/MM3 (ref 130–400)
PMV BLD AUTO: 10.2 FL (ref 6–10)
POTASSIUM BLD-SCNC: 3.6 MMOL/L (ref 3.5–5.3)
PROT SERPL-MCNC: 8.2 G/DL (ref 6–8)
PROT UR QL STRIP: ABNORMAL
PROTHROMBIN TIME: 14.2 SECONDS (ref 11–15.4)
RBC # BLD AUTO: 5.73 10*6/MM3 (ref 4.7–6.1)
RBC # UR: ABNORMAL /HPF
REF LAB TEST METHOD: ABNORMAL
SODIUM BLD-SCNC: 137 MMOL/L (ref 135–153)
SP GR UR STRIP: 1.03 (ref 1–1.03)
SQUAMOUS #/AREA URNS HPF: ABNORMAL /HPF
UROBILINOGEN UR QL STRIP: ABNORMAL
WBC NRBC COR # BLD: 20.21 10*3/MM3 (ref 4.5–12.5)
WBC UR QL AUTO: ABNORMAL /HPF

## 2018-10-19 PROCEDURE — 96365 THER/PROPH/DIAG IV INF INIT: CPT

## 2018-10-19 PROCEDURE — 87804 INFLUENZA ASSAY W/OPTIC: CPT | Performed by: PHYSICIAN ASSISTANT

## 2018-10-19 PROCEDURE — 36415 COLL VENOUS BLD VENIPUNCTURE: CPT

## 2018-10-19 PROCEDURE — 80307 DRUG TEST PRSMV CHEM ANLYZR: CPT | Performed by: PHYSICIAN ASSISTANT

## 2018-10-19 PROCEDURE — 83690 ASSAY OF LIPASE: CPT | Performed by: PHYSICIAN ASSISTANT

## 2018-10-19 PROCEDURE — G0432 EIA HIV-1/HIV-2 SCREEN: HCPCS | Performed by: PHYSICIAN ASSISTANT

## 2018-10-19 PROCEDURE — 96375 TX/PRO/DX INJ NEW DRUG ADDON: CPT

## 2018-10-19 PROCEDURE — 99283 EMERGENCY DEPT VISIT LOW MDM: CPT

## 2018-10-19 PROCEDURE — 83605 ASSAY OF LACTIC ACID: CPT | Performed by: PHYSICIAN ASSISTANT

## 2018-10-19 PROCEDURE — 87040 BLOOD CULTURE FOR BACTERIA: CPT | Performed by: PHYSICIAN ASSISTANT

## 2018-10-19 PROCEDURE — 96361 HYDRATE IV INFUSION ADD-ON: CPT

## 2018-10-19 PROCEDURE — 80053 COMPREHEN METABOLIC PANEL: CPT | Performed by: PHYSICIAN ASSISTANT

## 2018-10-19 PROCEDURE — 74177 CT ABD & PELVIS W/CONTRAST: CPT | Performed by: RADIOLOGY

## 2018-10-19 PROCEDURE — 80074 ACUTE HEPATITIS PANEL: CPT | Performed by: PHYSICIAN ASSISTANT

## 2018-10-19 PROCEDURE — 25010000002 ONDANSETRON PER 1 MG: Performed by: PHYSICIAN ASSISTANT

## 2018-10-19 PROCEDURE — 81001 URINALYSIS AUTO W/SCOPE: CPT | Performed by: PHYSICIAN ASSISTANT

## 2018-10-19 PROCEDURE — 25010000002 CEFTRIAXONE: Performed by: PHYSICIAN ASSISTANT

## 2018-10-19 PROCEDURE — 85610 PROTHROMBIN TIME: CPT | Performed by: PHYSICIAN ASSISTANT

## 2018-10-19 PROCEDURE — 85025 COMPLETE CBC W/AUTO DIFF WBC: CPT | Performed by: PHYSICIAN ASSISTANT

## 2018-10-19 PROCEDURE — 25010000002 IOPAMIDOL 61 % SOLUTION: Performed by: EMERGENCY MEDICINE

## 2018-10-19 PROCEDURE — 74177 CT ABD & PELVIS W/CONTRAST: CPT

## 2018-10-19 RX ORDER — ONDANSETRON 4 MG/1
4 TABLET, ORALLY DISINTEGRATING ORAL EVERY 6 HOURS PRN
Qty: 20 TABLET | Refills: 0 | Status: ON HOLD | OUTPATIENT
Start: 2018-10-19 | End: 2022-05-05

## 2018-10-19 RX ORDER — ONDANSETRON 2 MG/ML
4 INJECTION INTRAMUSCULAR; INTRAVENOUS ONCE
Status: COMPLETED | OUTPATIENT
Start: 2018-10-19 | End: 2018-10-19

## 2018-10-19 RX ORDER — CIPROFLOXACIN 500 MG/1
500 TABLET, FILM COATED ORAL 2 TIMES DAILY
Qty: 20 TABLET | Refills: 0 | Status: ON HOLD | OUTPATIENT
Start: 2018-10-19 | End: 2022-05-05

## 2018-10-19 RX ORDER — SODIUM CHLORIDE 0.9 % (FLUSH) 0.9 %
10 SYRINGE (ML) INJECTION AS NEEDED
Status: DISCONTINUED | OUTPATIENT
Start: 2018-10-19 | End: 2018-10-20 | Stop reason: HOSPADM

## 2018-10-19 RX ADMIN — IOPAMIDOL 90 ML: 612 INJECTION, SOLUTION INTRAVENOUS at 22:01

## 2018-10-19 RX ADMIN — SODIUM CHLORIDE 1000 ML: 9 INJECTION, SOLUTION INTRAVENOUS at 20:50

## 2018-10-19 RX ADMIN — ONDANSETRON 4 MG: 2 INJECTION INTRAMUSCULAR; INTRAVENOUS at 20:50

## 2018-10-19 RX ADMIN — CEFTRIAXONE 1 G: 1 INJECTION, POWDER, FOR SOLUTION INTRAMUSCULAR; INTRAVENOUS at 22:20

## 2018-10-20 NOTE — ED PROVIDER NOTES
Subjective   Pt presents to ED in care of friend with c/o generalized body aches, mild back pain, nausea, vomiting, abdominal cramping that started suddenly this afternoon. Pt states he went to work this morning and felt normal, went on lunch break to the gas station and got a [place] and symptoms started abruptly after. He denies fever, headache, sore throat, chest pain, cough, or shortness of breath. Does state he is an IVDA, last used suboxone x2 days ago. He has known hepatitis C, but thinks this was treated last year.         History provided by:  Patient   used: No    Illness   Severity:  Moderate  Onset quality:  Sudden  Duration:  6 hours  Timing:  Constant  Progression:  Unchanged  Chronicity:  New  Associated symptoms: abdominal pain, myalgias, nausea and vomiting    Associated symptoms: no chest pain, no congestion, no cough, no diarrhea, no fever, no headaches, no rash, no rhinorrhea, no shortness of breath, no sore throat and no wheezing        Review of Systems   Constitutional: Negative for activity change and fever.   HENT: Negative for congestion, rhinorrhea and sore throat.    Eyes: Negative for pain and redness.   Respiratory: Negative for cough, shortness of breath and wheezing.    Cardiovascular: Negative for chest pain.   Gastrointestinal: Positive for abdominal pain, nausea and vomiting. Negative for abdominal distention and diarrhea.   Endocrine: Negative for polyphagia and polyuria.   Genitourinary: Negative for difficulty urinating and dysuria.   Musculoskeletal: Positive for myalgias. Negative for arthralgias.   Skin: Negative for rash and wound.   Allergic/Immunologic: Negative for food allergies and immunocompromised state.   Neurological: Negative for dizziness and headaches.   Hematological: Negative for adenopathy. Does not bruise/bleed easily.   Psychiatric/Behavioral: Negative for agitation and confusion.   All other systems reviewed and are negative.      Past  Medical History:   Diagnosis Date   • Anxiety    • Bipolar disorder (CMS/HCC)    • Depression    • Hyperthyroidism    • Liver disease    • PTSD (post-traumatic stress disorder)    • Substance abuse    • Suicidal thoughts    • Suicide attempt    • Thyroid disease        Allergies   Allergen Reactions   • Penicillins Anaphylaxis and Rash   • Benadryl [Diphenhydramine] Rash       Past Surgical History:   Procedure Laterality Date   • MOUTH SURGERY         Family History   Problem Relation Age of Onset   • Alcohol abuse Father    • No Known Problems Mother    • No Known Problems Sister    • No Known Problems Brother    • No Known Problems Sister    • No Known Problems Sister    • No Known Problems Brother    • No Known Problems Brother        Social History     Social History   • Marital status: Single     Social History Main Topics   • Smoking status: Current Every Day Smoker     Packs/day: 1.00     Years: 22.00     Types: Cigarettes   • Smokeless tobacco: Never Used   • Alcohol use No   • Drug use: Yes     Types: Benzodiazepines, Methamphetamines, Marijuana, Other      Comment: states uses benzos, eith pepe, xanax, or klonopin , whatever available; 3-4 x wk po ; states today he took 2,  1 mg klonopins   • Sexual activity: Defer     Other Topics Concern   • Not on file           Objective   Physical Exam   Constitutional: He is oriented to person, place, and time. He appears well-developed and well-nourished.   HENT:   Head: Normocephalic and atraumatic.   Eyes: Pupils are equal, round, and reactive to light. EOM are normal.   Neck: Normal range of motion. Neck supple.   Cardiovascular: Normal rate, regular rhythm and normal heart sounds.    Pulmonary/Chest: Effort normal and breath sounds normal.   Abdominal: Soft. Bowel sounds are normal. There is tenderness in the right lower quadrant and left lower quadrant.   Minimal lower abdominal TTP. No guarding, rebound.    Musculoskeletal: Normal range of motion.    Neurological: He is alert and oriented to person, place, and time.   Skin: Skin is warm and dry.   Psychiatric: He has a normal mood and affect. His behavior is normal. Judgment and thought content normal.   Nursing note and vitals reviewed.      Procedures           ED Course  ED Course as of Oct 19 2303   Fri Oct 19, 2018   2052 Pt requesting to be checked for HIV, states he is an IVDA and shares needles with others. Does have known hep C  [CHUCK]   2146 Report given to Laz Cary PA-C, will assume care of patient.   [CHUCK]   2245 CT abdomen and pelvis interpreted by virtual radiology: No acute findings.  [RB]      ED Course User Index  [CHUCK] Sky Holt PA  [RB] Laz Cary PA                  MDM  Number of Diagnoses or Management Options  Acute UTI: new and requires workup  Gastroenteritis: new and requires workup     Amount and/or Complexity of Data Reviewed  Clinical lab tests: ordered and reviewed  Tests in the radiology section of CPT®: ordered and reviewed  Tests in the medicine section of CPT®: reviewed and ordered    Risk of Complications, Morbidity, and/or Mortality  Presenting problems: moderate  Diagnostic procedures: moderate  Management options: low    Patient Progress  Patient progress: stable        Final diagnoses:   Gastroenteritis   Acute UTI            Laz Cary PA  10/19/18 3555

## 2018-10-20 NOTE — DISCHARGE INSTRUCTIONS
Call one of the offices below to establish a primary care provider.  If you are unable to get an appointment and feel it is an emergency and need to be seen immediately please return to the Emergency Department.    Call one of the office below to set up a primary care provider.    Dr. Yung Rios                                                                                                       [address] [facility] [address]  [phone]    Dr. Tanner, Dr. JEFERSON Pitt, Dr. MAYA Pitt (Critical access hospital)  [address] [facility] [address]  [phone]    Dr. Marie, Dr. Jones, Dr. Nguyen (Critical access hospital)  [address] [facility] [address]  [phone]    Dr. Morillo  [address] [facility] [address]  [phone]    Dr. Sharpe, Dr. Mir, Dr. Gillette, Dr. Puri ([facility])  [address] [place] [address] [address]  [facility] [address]  [phone]    Dr. Ayaka Saucedo  [address] [facility] [place] [address]  [phone]    Dr. Sheri Guzman  [address]   [address]  [facility] [address]  [phone]    Dr. Rios  [address] [facility] [address]  [phone]    Dr. Arias, Dr. Guzman  [address] [facility] [place] [address]  [phone]    Dr. Majano  [address] [address]                                                              [address]  [facility] [address]  [phone]    Dr. Brown  [address] [facility] [id number] [phone]    Dr. Isabella Landeros  [address]  [address]  [facility] [address]  [phone]

## 2018-10-24 LAB — BACTERIA SPEC AEROBE CULT: NORMAL

## 2022-05-05 ENCOUNTER — HOSPITAL ENCOUNTER (INPATIENT)
Facility: HOSPITAL | Age: 37
LOS: 3 days | Discharge: LEFT BEFORE DISCHARGE | End: 2022-05-08
Attending: PSYCHIATRY & NEUROLOGY | Admitting: PSYCHIATRY & NEUROLOGY

## 2022-05-05 ENCOUNTER — HOSPITAL ENCOUNTER (EMERGENCY)
Facility: HOSPITAL | Age: 37
Discharge: STILL ADMITTED OR ADMINISTRATIVE | End: 2022-05-05
Attending: STUDENT IN AN ORGANIZED HEALTH CARE EDUCATION/TRAINING PROGRAM

## 2022-05-05 VITALS
TEMPERATURE: 97.8 F | HEART RATE: 65 BPM | DIASTOLIC BLOOD PRESSURE: 64 MMHG | BODY MASS INDEX: 22.82 KG/M2 | HEIGHT: 71 IN | OXYGEN SATURATION: 99 % | WEIGHT: 163 LBS | RESPIRATION RATE: 18 BRPM | SYSTOLIC BLOOD PRESSURE: 121 MMHG

## 2022-05-05 DIAGNOSIS — F19.10 SUBSTANCE ABUSE: Primary | ICD-10-CM

## 2022-05-05 LAB
ALBUMIN SERPL-MCNC: 3.83 G/DL (ref 3.5–5.2)
ALBUMIN/GLOB SERPL: 1.2 G/DL
ALP SERPL-CCNC: 120 U/L (ref 39–117)
ALT SERPL W P-5'-P-CCNC: 23 U/L (ref 1–41)
AMPHET+METHAMPHET UR QL: POSITIVE
AMPHETAMINES UR QL: POSITIVE
ANION GAP SERPL CALCULATED.3IONS-SCNC: 11.2 MMOL/L (ref 5–15)
AST SERPL-CCNC: 37 U/L (ref 1–40)
BARBITURATES UR QL SCN: NEGATIVE
BASOPHILS # BLD AUTO: 0.07 10*3/MM3 (ref 0–0.2)
BASOPHILS NFR BLD AUTO: 0.8 % (ref 0–1.5)
BENZODIAZ UR QL SCN: NEGATIVE
BILIRUB SERPL-MCNC: 0.4 MG/DL (ref 0–1.2)
BILIRUB UR QL STRIP: NEGATIVE
BUN SERPL-MCNC: 19 MG/DL (ref 6–20)
BUN/CREAT SERPL: 22.6 (ref 7–25)
BUPRENORPHINE SERPL-MCNC: POSITIVE NG/ML
CALCIUM SPEC-SCNC: 9.3 MG/DL (ref 8.6–10.5)
CANNABINOIDS SERPL QL: POSITIVE
CHLORIDE SERPL-SCNC: 103 MMOL/L (ref 98–107)
CLARITY UR: CLEAR
CO2 SERPL-SCNC: 22.8 MMOL/L (ref 22–29)
COCAINE UR QL: NEGATIVE
COLOR UR: ABNORMAL
CREAT SERPL-MCNC: 0.84 MG/DL (ref 0.76–1.27)
DEPRECATED RDW RBC AUTO: 43.5 FL (ref 37–54)
EGFRCR SERPLBLD CKD-EPI 2021: 114.5 ML/MIN/1.73
EOSINOPHIL # BLD AUTO: 0.25 10*3/MM3 (ref 0–0.4)
EOSINOPHIL NFR BLD AUTO: 2.8 % (ref 0.3–6.2)
ERYTHROCYTE [DISTWIDTH] IN BLOOD BY AUTOMATED COUNT: 13.7 % (ref 12.3–15.4)
ETHANOL BLD-MCNC: <10 MG/DL (ref 0–10)
ETHANOL UR QL: <0.01 %
FLUAV RNA RESP QL NAA+PROBE: NOT DETECTED
FLUBV RNA RESP QL NAA+PROBE: NOT DETECTED
GLOBULIN UR ELPH-MCNC: 3.1 GM/DL
GLUCOSE SERPL-MCNC: 132 MG/DL (ref 65–99)
GLUCOSE UR STRIP-MCNC: NEGATIVE MG/DL
HCT VFR BLD AUTO: 44.1 % (ref 37.5–51)
HGB BLD-MCNC: 14.4 G/DL (ref 13–17.7)
HGB UR QL STRIP.AUTO: NEGATIVE
IMM GRANULOCYTES # BLD AUTO: 0.02 10*3/MM3 (ref 0–0.05)
IMM GRANULOCYTES NFR BLD AUTO: 0.2 % (ref 0–0.5)
KETONES UR QL STRIP: ABNORMAL
LEUKOCYTE ESTERASE UR QL STRIP.AUTO: NEGATIVE
LYMPHOCYTES # BLD AUTO: 3.2 10*3/MM3 (ref 0.7–3.1)
LYMPHOCYTES NFR BLD AUTO: 35.3 % (ref 19.6–45.3)
MAGNESIUM SERPL-MCNC: 2.3 MG/DL (ref 1.6–2.6)
MCH RBC QN AUTO: 28.5 PG (ref 26.6–33)
MCHC RBC AUTO-ENTMCNC: 32.7 G/DL (ref 31.5–35.7)
MCV RBC AUTO: 87.2 FL (ref 79–97)
METHADONE UR QL SCN: NEGATIVE
MONOCYTES # BLD AUTO: 0.79 10*3/MM3 (ref 0.1–0.9)
MONOCYTES NFR BLD AUTO: 8.7 % (ref 5–12)
NEUTROPHILS NFR BLD AUTO: 4.74 10*3/MM3 (ref 1.7–7)
NEUTROPHILS NFR BLD AUTO: 52.2 % (ref 42.7–76)
NITRITE UR QL STRIP: NEGATIVE
NRBC BLD AUTO-RTO: 0 /100 WBC (ref 0–0.2)
OPIATES UR QL: NEGATIVE
OXYCODONE UR QL SCN: NEGATIVE
PCP UR QL SCN: NEGATIVE
PH UR STRIP.AUTO: <=5 [PH] (ref 5–8)
PLATELET # BLD AUTO: 316 10*3/MM3 (ref 140–450)
PMV BLD AUTO: 9.7 FL (ref 6–12)
POTASSIUM SERPL-SCNC: 4.1 MMOL/L (ref 3.5–5.2)
PROPOXYPH UR QL: NEGATIVE
PROT SERPL-MCNC: 6.9 G/DL (ref 6–8.5)
PROT UR QL STRIP: NEGATIVE
RBC # BLD AUTO: 5.06 10*6/MM3 (ref 4.14–5.8)
SARS-COV-2 RNA RESP QL NAA+PROBE: NOT DETECTED
SODIUM SERPL-SCNC: 137 MMOL/L (ref 136–145)
SP GR UR STRIP: 1.03 (ref 1–1.03)
TRICYCLICS UR QL SCN: NEGATIVE
UROBILINOGEN UR QL STRIP: ABNORMAL
WBC NRBC COR # BLD: 9.07 10*3/MM3 (ref 3.4–10.8)

## 2022-05-05 PROCEDURE — 93005 ELECTROCARDIOGRAM TRACING: CPT | Performed by: PSYCHIATRY & NEUROLOGY

## 2022-05-05 PROCEDURE — 87636 SARSCOV2 & INF A&B AMP PRB: CPT | Performed by: STUDENT IN AN ORGANIZED HEALTH CARE EDUCATION/TRAINING PROGRAM

## 2022-05-05 PROCEDURE — 80306 DRUG TEST PRSMV INSTRMNT: CPT | Performed by: STUDENT IN AN ORGANIZED HEALTH CARE EDUCATION/TRAINING PROGRAM

## 2022-05-05 PROCEDURE — 81003 URINALYSIS AUTO W/O SCOPE: CPT | Performed by: STUDENT IN AN ORGANIZED HEALTH CARE EDUCATION/TRAINING PROGRAM

## 2022-05-05 PROCEDURE — 93010 ELECTROCARDIOGRAM REPORT: CPT | Performed by: INTERNAL MEDICINE

## 2022-05-05 PROCEDURE — 99285 EMERGENCY DEPT VISIT HI MDM: CPT

## 2022-05-05 PROCEDURE — 36415 COLL VENOUS BLD VENIPUNCTURE: CPT

## 2022-05-05 PROCEDURE — 83735 ASSAY OF MAGNESIUM: CPT | Performed by: EMERGENCY MEDICINE

## 2022-05-05 PROCEDURE — 82077 ASSAY SPEC XCP UR&BREATH IA: CPT | Performed by: STUDENT IN AN ORGANIZED HEALTH CARE EDUCATION/TRAINING PROGRAM

## 2022-05-05 PROCEDURE — 85025 COMPLETE CBC W/AUTO DIFF WBC: CPT | Performed by: STUDENT IN AN ORGANIZED HEALTH CARE EDUCATION/TRAINING PROGRAM

## 2022-05-05 PROCEDURE — 80053 COMPREHEN METABOLIC PANEL: CPT | Performed by: STUDENT IN AN ORGANIZED HEALTH CARE EDUCATION/TRAINING PROGRAM

## 2022-05-05 RX ORDER — IBUPROFEN 400 MG/1
400 TABLET ORAL EVERY 6 HOURS PRN
Status: DISCONTINUED | OUTPATIENT
Start: 2022-05-05 | End: 2022-05-08 | Stop reason: HOSPADM

## 2022-05-05 RX ORDER — BUPRENORPHINE HYDROCHLORIDE AND NALOXONE HYDROCHLORIDE DIHYDRATE 8; 2 MG/1; MG/1
3 TABLET SUBLINGUAL DAILY
COMMUNITY
End: 2022-05-08 | Stop reason: HOSPADM

## 2022-05-05 RX ORDER — HYDROXYZINE 50 MG/1
50 TABLET, FILM COATED ORAL EVERY 6 HOURS PRN
Status: DISCONTINUED | OUTPATIENT
Start: 2022-05-05 | End: 2022-05-08 | Stop reason: HOSPADM

## 2022-05-05 RX ORDER — LOPERAMIDE HYDROCHLORIDE 2 MG/1
2 CAPSULE ORAL
Status: DISCONTINUED | OUTPATIENT
Start: 2022-05-05 | End: 2022-05-08 | Stop reason: HOSPADM

## 2022-05-05 RX ORDER — TRAZODONE HYDROCHLORIDE 50 MG/1
50 TABLET ORAL NIGHTLY PRN
Status: DISCONTINUED | OUTPATIENT
Start: 2022-05-05 | End: 2022-05-08 | Stop reason: HOSPADM

## 2022-05-05 RX ORDER — ONDANSETRON 4 MG/1
4 TABLET, FILM COATED ORAL EVERY 6 HOURS PRN
Status: DISCONTINUED | OUTPATIENT
Start: 2022-05-05 | End: 2022-05-08 | Stop reason: HOSPADM

## 2022-05-05 RX ORDER — FAMOTIDINE 20 MG/1
20 TABLET, FILM COATED ORAL 2 TIMES DAILY PRN
Status: DISCONTINUED | OUTPATIENT
Start: 2022-05-05 | End: 2022-05-08 | Stop reason: HOSPADM

## 2022-05-05 RX ORDER — DICYCLOMINE HYDROCHLORIDE 10 MG/1
10 CAPSULE ORAL 3 TIMES DAILY PRN
Status: DISCONTINUED | OUTPATIENT
Start: 2022-05-05 | End: 2022-05-08 | Stop reason: HOSPADM

## 2022-05-05 RX ORDER — BENZTROPINE MESYLATE 1 MG/1
2 TABLET ORAL ONCE AS NEEDED
Status: DISCONTINUED | OUTPATIENT
Start: 2022-05-05 | End: 2022-05-08 | Stop reason: HOSPADM

## 2022-05-05 RX ORDER — CLONIDINE HYDROCHLORIDE 0.1 MG/1
0.1 TABLET ORAL 4 TIMES DAILY PRN
Status: ACTIVE | OUTPATIENT
Start: 2022-05-05 | End: 2022-05-06

## 2022-05-05 RX ORDER — CLONIDINE HYDROCHLORIDE 0.1 MG/1
0.1 TABLET ORAL 2 TIMES DAILY PRN
Status: DISCONTINUED | OUTPATIENT
Start: 2022-05-08 | End: 2022-05-08 | Stop reason: HOSPADM

## 2022-05-05 RX ORDER — ECHINACEA PURPUREA EXTRACT 125 MG
2 TABLET ORAL AS NEEDED
Status: DISCONTINUED | OUTPATIENT
Start: 2022-05-05 | End: 2022-05-08 | Stop reason: HOSPADM

## 2022-05-05 RX ORDER — CLONIDINE HYDROCHLORIDE 0.1 MG/1
0.1 TABLET ORAL 4 TIMES DAILY PRN
Status: DISPENSED | OUTPATIENT
Start: 2022-05-06 | End: 2022-05-07

## 2022-05-05 RX ORDER — ACETAMINOPHEN 325 MG/1
650 TABLET ORAL EVERY 6 HOURS PRN
Status: DISCONTINUED | OUTPATIENT
Start: 2022-05-05 | End: 2022-05-08 | Stop reason: HOSPADM

## 2022-05-05 RX ORDER — CLONIDINE HYDROCHLORIDE 0.1 MG/1
0.1 TABLET ORAL DAILY PRN
Status: DISCONTINUED | OUTPATIENT
Start: 2022-05-09 | End: 2022-05-08 | Stop reason: HOSPADM

## 2022-05-05 RX ORDER — BENZTROPINE MESYLATE 1 MG/ML
1 INJECTION INTRAMUSCULAR; INTRAVENOUS ONCE AS NEEDED
Status: DISCONTINUED | OUTPATIENT
Start: 2022-05-05 | End: 2022-05-08 | Stop reason: HOSPADM

## 2022-05-05 RX ORDER — BUPRENORPHINE HYDROCHLORIDE AND NALOXONE HYDROCHLORIDE DIHYDRATE 8; 2 MG/1; MG/1
3 TABLET SUBLINGUAL DAILY
Status: CANCELLED | OUTPATIENT
Start: 2022-05-05

## 2022-05-05 RX ORDER — NICOTINE 21 MG/24HR
1 PATCH, TRANSDERMAL 24 HOURS TRANSDERMAL
Status: DISCONTINUED | OUTPATIENT
Start: 2022-05-05 | End: 2022-05-08 | Stop reason: HOSPADM

## 2022-05-05 RX ORDER — BENZONATATE 100 MG/1
100 CAPSULE ORAL 3 TIMES DAILY PRN
Status: DISCONTINUED | OUTPATIENT
Start: 2022-05-05 | End: 2022-05-08 | Stop reason: HOSPADM

## 2022-05-05 RX ORDER — ALUMINA, MAGNESIA, AND SIMETHICONE 2400; 2400; 240 MG/30ML; MG/30ML; MG/30ML
15 SUSPENSION ORAL EVERY 6 HOURS PRN
Status: DISCONTINUED | OUTPATIENT
Start: 2022-05-05 | End: 2022-05-08 | Stop reason: HOSPADM

## 2022-05-05 RX ORDER — SODIUM CHLORIDE 0.9 % (FLUSH) 0.9 %
10 SYRINGE (ML) INJECTION AS NEEDED
Status: DISCONTINUED | OUTPATIENT
Start: 2022-05-05 | End: 2022-05-05 | Stop reason: HOSPADM

## 2022-05-05 RX ORDER — CLONIDINE HYDROCHLORIDE 0.1 MG/1
0.1 TABLET ORAL 3 TIMES DAILY PRN
Status: ACTIVE | OUTPATIENT
Start: 2022-05-07 | End: 2022-05-08

## 2022-05-05 RX ORDER — CYCLOBENZAPRINE HCL 10 MG
10 TABLET ORAL 3 TIMES DAILY PRN
Status: DISCONTINUED | OUTPATIENT
Start: 2022-05-05 | End: 2022-05-08 | Stop reason: HOSPADM

## 2022-05-05 RX ADMIN — SODIUM CHLORIDE 1000 ML: 9 INJECTION, SOLUTION INTRAVENOUS at 07:39

## 2022-05-05 NOTE — NURSING NOTE
Spoke to ED lead Neetu, and patient taken to ED17 for medical. Explained to patient that once he is a little more alert and stable he would come back to me and we would take care of him. Pt verbalized information. Report given to ED staff. Care transferred.

## 2022-05-05 NOTE — ED PROVIDER NOTES
Subjective   37-year-old male presents to the ER for detox.  Patient has a longstanding history of substance abuse.  Patient denied chest pain or shortness of breath.  Denied fever chills.  Denied headache or vision changes.  Denied suicidal homicidal ideation.  Vitals stable          Review of Systems   Psychiatric/Behavioral: The patient is nervous/anxious.    All other systems reviewed and are negative.      Past Medical History:   Diagnosis Date   • Anxiety    • Bipolar disorder (HCC)    • Depression    • Hyperthyroidism    • Liver disease    • PTSD (post-traumatic stress disorder)    • Substance abuse (HCC)    • Suicidal thoughts    • Suicide attempt (HCC)    • Thyroid disease        Allergies   Allergen Reactions   • Penicillins Anaphylaxis and Rash   • Benadryl [Diphenhydramine] Rash       Past Surgical History:   Procedure Laterality Date   • MOUTH SURGERY         Family History   Problem Relation Age of Onset   • Alcohol abuse Father    • No Known Problems Mother    • No Known Problems Sister    • No Known Problems Brother    • No Known Problems Sister    • No Known Problems Sister    • No Known Problems Brother    • No Known Problems Brother        Social History     Socioeconomic History   • Marital status: Single   • Number of children: 3   • Years of education: 7   • Highest education level: GED or equivalent   Tobacco Use   • Smoking status: Current Every Day Smoker     Packs/day: 1.50     Years: 29.00     Pack years: 43.50     Types: Cigarettes   • Smokeless tobacco: Never Used   • Tobacco comment: beagan smoking at 8 years old   Substance and Sexual Activity   • Alcohol use: No     Comment: denies use   • Drug use: Yes     Types: Benzodiazepines, Methamphetamines, Marijuana, Other     Comment: see below   • Sexual activity: Not Currently     Partners: Female           Objective   Physical Exam  Constitutional:       General: He is not in acute distress.     Appearance: He is well-developed. He is not  ill-appearing.   HENT:      Head: Normocephalic and atraumatic.   Eyes:      Extraocular Movements: Extraocular movements intact.      Pupils: Pupils are equal, round, and reactive to light.   Neck:      Vascular: No JVD.   Cardiovascular:      Rate and Rhythm: Normal rate and regular rhythm.      Heart sounds: Normal heart sounds. No murmur heard.  Pulmonary:      Effort: No tachypnea, accessory muscle usage or respiratory distress.      Breath sounds: Normal breath sounds. No stridor. No decreased breath sounds, wheezing, rhonchi or rales.   Chest:      Chest wall: No deformity, tenderness or crepitus.   Abdominal:      General: Bowel sounds are normal.      Palpations: Abdomen is soft.      Tenderness: There is no abdominal tenderness. There is no guarding or rebound.   Musculoskeletal:         General: Normal range of motion.      Cervical back: Normal range of motion and neck supple.      Right lower leg: No tenderness. No edema.      Left lower leg: No tenderness. No edema.   Lymphadenopathy:      Cervical: No cervical adenopathy.   Skin:     General: Skin is warm and dry.      Coloration: Skin is not cyanotic.      Findings: No ecchymosis or erythema.   Neurological:      General: No focal deficit present.      Mental Status: He is alert and oriented to person, place, and time.      Cranial Nerves: No cranial nerve deficit.      Motor: No weakness.   Psychiatric:         Mood and Affect: Mood normal. Mood is not anxious.         Behavior: Behavior normal. Behavior is not agitated.         Procedures           ED Course  ED Course as of 05/06/22 0650   Fri May 06, 2022   0649 Patient was cleared to be evaluated by behavioral health. [SF]      ED Course User Index  [SF] Roman Owens DO                                                 [facility]    Final diagnoses:   Substance abuse (HCC)       ED Disposition  ED Disposition     ED Disposition   DC/Transfer to Behavioral [facility]    Condition   Stable    Comment   --              No follow-up provider specified.       Medication List      No changes were made to your prescriptions during this visit.          Roman Owens DO  05/06/22 0650

## 2022-05-05 NOTE — PLAN OF CARE
Goal Outcome Evaluation:  Plan of Care Reviewed With: patient  Patient Agreement with Plan of Care: agrees     Progress: no change  Outcome Evaluation: Patient very fatigued and hungry from admit process no acute distress noted will continue to monitor

## 2022-05-05 NOTE — NURSING NOTE
Pt sitting in chair, appears very drowsy and lethargic. Called and made Dr. Owens aware of concerns due to him reporting that he used on his way here. Instructed that we can move him to medical.

## 2022-05-05 NOTE — NURSING NOTE
Pt has 2 knives sent to Security. Pt had a syringe in his bag. Security and T supervised pt disposing of syringe in Sharps container.

## 2022-05-05 NOTE — NURSING NOTE
Intake assessment completed. Patient presents to intake and states that he walked half-way here from [facility], [place] and someone picked him up and gave him a ride here. He states that he decided to come in because if he does not stop taking drugs and get clean that he is going to die. He reports he almost overdosed two days ago. And has done so four times already in the past two years. He states that he is using heroin, meth, suboxone, and mariajuana every day and he is taking so much that he cant stop on his own. He denies any SI HI or AVH. He states that he needs to get into some kind of rehab. Last use of anything was about an hour before coming in. He also COWS 10. Reports that he is aching all over. Body aches chills and nausea. He denies current use of etoh or benzos. He states that he used some meth and suboxone just before coming in today on his way in because he did not want to be sick. Emotional support provided to pt. Patient states that right now he is just real tired from walking so much.

## 2022-05-05 NOTE — NURSING NOTE
Spoke to  via phone. Intake information provided. Instructed to admit the patient. Admit orders received. RBVOx2.. Patient and ed provider made aware of plan of care. Safety precautions maintained.    diffuse muscle pain/BACK PAIN

## 2022-05-05 NOTE — NURSING NOTE
Patient returned  to intake from medical. At this time he is alert and talking with staff. States that he is just ready to go to detox. Denies any SI and HI. COWS 8 at this time.

## 2022-05-05 NOTE — ED NOTES
Attempted to collect labs, was able to get in vein but the blood would not draw. Pt kept moving and was about to pass out.

## 2022-05-05 NOTE — ED NOTES
Multiple attempts to draw patients blood work, without success. Patient states he needs a break for now.

## 2022-05-06 PROBLEM — F12.10 TETRAHYDROCANNABINOL (THC) USE DISORDER, MILD, ABUSE: Status: ACTIVE | Noted: 2022-05-06

## 2022-05-06 PROBLEM — F17.200 NICOTINE USE DISORDER: Status: ACTIVE | Noted: 2022-05-06

## 2022-05-06 PROBLEM — F11.20 OPIOID USE DISORDER, SEVERE, DEPENDENCE: Status: ACTIVE | Noted: 2018-06-13

## 2022-05-06 LAB
QT INTERVAL: 438 MS
QTC INTERVAL: 407 MS

## 2022-05-06 PROCEDURE — 99223 1ST HOSP IP/OBS HIGH 75: CPT | Performed by: PSYCHIATRY & NEUROLOGY

## 2022-05-06 PROCEDURE — 63710000001 ONDANSETRON PER 8 MG: Performed by: PSYCHIATRY & NEUROLOGY

## 2022-05-06 RX ORDER — BUPRENORPHINE 2 MG/1
2 TABLET SUBLINGUAL 2 TIMES DAILY
Status: COMPLETED | OUTPATIENT
Start: 2022-05-06 | End: 2022-05-08

## 2022-05-06 RX ADMIN — DICYCLOMINE HYDROCHLORIDE 10 MG: 10 CAPSULE ORAL at 14:28

## 2022-05-06 RX ADMIN — IBUPROFEN 400 MG: 400 TABLET, FILM COATED ORAL at 14:28

## 2022-05-06 RX ADMIN — LOPERAMIDE HYDROCHLORIDE 2 MG: 2 CAPSULE ORAL at 14:28

## 2022-05-06 RX ADMIN — ONDANSETRON HYDROCHLORIDE 4 MG: 4 TABLET, FILM COATED ORAL at 14:28

## 2022-05-06 RX ADMIN — CYCLOBENZAPRINE 10 MG: 10 TABLET, FILM COATED ORAL at 14:28

## 2022-05-06 RX ADMIN — CLONIDINE HYDROCHLORIDE 0.1 MG: 0.1 TABLET ORAL at 14:34

## 2022-05-06 RX ADMIN — HYDROXYZINE HYDROCHLORIDE 50 MG: 50 TABLET ORAL at 14:28

## 2022-05-06 RX ADMIN — BUPRENORPHINE HCL 2 MG: 2 TABLET SUBLINGUAL at 12:12

## 2022-05-06 RX ADMIN — BUPRENORPHINE HCL 2 MG: 2 TABLET SUBLINGUAL at 21:10

## 2022-05-06 NOTE — H&P
INITIAL PSYCHIATRIC HISTORY & PHYSICAL    Patient Identification:  Name:  Jabari Telles  Age:  37 y.o.  Sex:  male  :  1985  MRN:  [id number]   Visit Number:  [id number]  Primary Care Physician:  Provider, No Known    SUBJECTIVE    CC/Focus of Exam: heroin, meth and thc use    HPI: Jabari Telles is a 37 y.o. male who was admitted on 2022 with complaints of heroin, meth and thc use and withdrawals. The patient reports a long history of substance use. First use was at age 13 when he started using marijuana and morphine. At age 16-17 he started using jocelyn and meth. Over time the use increased and the patient  continued to use despite negative consequences. He states he has lost everything, has no contact with family, no place to live, no job and all of this is due to his ongoing drug use. The patient endorses symptoms of tolerance and withdrawals. Has tried to cut down and stop but has not been successful. He made a statement that if he doesn't stop he will end up killing himself. No active SI/HI reported. Also denies AVH. Spends too much time and resources in pursuit of substance use. Longest period of sobriety is reported to be 2-3 days.  Currently using about a gram of heroin daily IV, a gram of meth daily IV, smokes a joint of marijuana about once a month. He states he takes a Suboxone when he cannot find heroin. He denies benzo or alcohol use.  Last use was a couple of days ago when he used Suboxone and meth. He took two Suboxone 8-2 mg tablets.   Withdrawal symptoms leg and stomach cramps, diarrhea, tremors, sweats, chills.   He denies feeling depressed but reports feeling anxious due to withdrawals.     PAST PSYCHIATRIC HX: The patient reports multiple inpatient psych admissions for depression and SI in the past. Last admission was in 2018 at the [facility] adult psych unit.     SUBSTANCE USE HX: See HPI. Smoked 1 to 1.5 ppd of cigarettes. The patient has been in treatment for opioid use at  [facility] in [place] and just recently moved to another clinic and has an active script but wants to quit taking Suboxone.      SOCIAL HX:   Social History     Socioeconomic History   • Marital status: Single   • Number of children: 3   • Years of education: 7   • Highest education level: GED or equivalent   Tobacco Use   • Smoking status: Current Every Day Smoker     Packs/day: 1.50     Years: 29.00     Pack years: 43.50     Types: Cigarettes   • Smokeless tobacco: Never Used   • Tobacco comment: beagan smoking at 8 years old   Substance and Sexual Activity   • Alcohol use: No     Comment: denies use   • Drug use: Yes     Types: Benzodiazepines, Methamphetamines, Marijuana, Other     Comment: see below   • Sexual activity: Not Currently     Partners: Female         Past Medical History:   Diagnosis Date   • Anxiety    • Bipolar disorder (HCC)    • Depression    • Hyperthyroidism    • Liver disease    • PTSD (post-traumatic stress disorder)    • Substance abuse (HCC)    • Suicidal thoughts    • Suicide attempt (HCC)    • Thyroid disease           Past Surgical History:   Procedure Laterality Date   • MOUTH SURGERY         Family History   Problem Relation Age of Onset   • Alcohol abuse Father    • No Known Problems Mother    • No Known Problems Sister    • No Known Problems Brother    • No Known Problems Sister    • No Known Problems Sister    • No Known Problems Brother    • No Known Problems Brother          Medications Prior to Admission   Medication Sig Dispense Refill Last Dose   • buprenorphine-naloxone (SUBOXONE) 8-2 MG per SL tablet Place 3 tablets under the tongue Daily.   5/4/2022 at Unknown time         ALLERGIES:  Penicillins and Benadryl [diphenhydramine]    Temp:  [97.5 °F (36.4 °C)-98.7 °F (37.1 °C)] 97.5 °F (36.4 °C)  Heart Rate:  [50-75] 60  Resp:  [16-18] 16  BP: (104-134)/(59-88) 134/87    REVIEW OF SYSTEMS:  Review of Systems   Constitutional: Positive for chills, diaphoresis and fatigue.    HENT: Positive for congestion and sore throat.    Eyes: Negative.    Respiratory: Negative.    Cardiovascular: Negative.    Gastrointestinal: Positive for diarrhea.   Endocrine: Negative.    Genitourinary: Negative.    Musculoskeletal: Positive for arthralgias, back pain and myalgias.   Skin: Negative.    Allergic/Immunologic: Negative.    Neurological: Positive for tremors.   Hematological: Negative.    Psychiatric/Behavioral: The patient is nervous/anxious.         OBJECTIVE    PHYSICAL EXAM:  Physical Exam  Constitutional:  Appears well-developed and well-nourished.   HENT:   Head: Normocephalic and atraumatic.   Right Ear: External ear normal.   Left Ear: External ear normal.   Mouth/Throat: Oropharynx is clear and moist.   Eyes: Pupils are equal, round, and reactive to light. Conjunctivae and EOM are normal.   Neck: Normal range of motion. Neck supple.   Cardiovascular: Normal rate, regular rhythm and normal heart sounds.    Respiratory: Effort normal and breath sounds normal. No respiratory distress. No wheezes.   GI: Soft. Bowel sounds are normal.No distension. There is no tenderness.   Musculoskeletal: Normal range of motion. No edema or deformity.   Neurological:No cranial nerve deficit. Coordination normal.   Skin: Skin is warm and dry. No rash noted. No erythema.       MENTAL STATUS EXAM:   Hygiene:   fair  Cooperation:  Cooperative  Eye Contact:  Fair  Psychomotor Behavior:  Appropriate  Affect:  Restricted  Hopelessness: Denies  Speech:  Normal  Goal directed  Thought Content:  Normal  Suicidal:  None  Homicidal:  None  Hallucinations:  None  Delusion:  None  Memory:  Intact  Orientation:  Person, Place, Time and Situation  Reliability:  fair  Insight:  Fair  Judgement:  Fair  Impulse Control:  Fair      Imaging Results (Last 24 Hours)     ** No results found for the last 24 hours. **           ECG/EMG Results (most recent)     Procedure Component Value Units Date/Time    ECG 12 Lead [[id number]]  Collected: 05/05/22 1652     Updated: 05/05/22 1654     QT Interval 438 ms      QTC Interval 407 ms     Narrative:      Test Reason : Baseline Cardiac Status  Blood Pressure :   */*   mmHG  Vent. Rate :  52 BPM     Atrial Rate :  52 BPM     P-R Int : 132 ms          QRS Dur :  96 ms      QT Int : 438 ms       P-R-T Axes :  -7  59  61 degrees     QTc Int : 407 ms    Sinus bradycardia with sinus arrhythmia  Otherwise normal ECG  When compared with ECG of 13-JUN-2018 01:16,  No significant change was found    Referred By: KELVIN           Confirmed By:            Lab Results   Component Value Date    GLUCOSE 132 (H) 05/05/2022    BUN 19 05/05/2022    CREATININE 0.84 05/05/2022    EGFRIFNONA 72 10/19/2018    BCR 22.6 05/05/2022    CO2 22.8 05/05/2022    CALCIUM 9.3 05/05/2022    ALBUMIN 3.83 05/05/2022    AST 37 05/05/2022    ALT 23 05/05/2022       Lab Results   Component Value Date    WBC 9.07 05/05/2022    HGB 14.4 05/05/2022    HCT 44.1 05/05/2022    MCV 87.2 05/05/2022     05/05/2022       Last Urine Toxicity     LAST URINE TOXICITY RESULTS Latest Ref Rng & Units 5/5/2022 10/19/2018    AMPHETAMINES SCREEN, URINE Negative Positive(A) Positive(A)    BARBITURATES SCREEN Negative Negative Negative    BENZODIAZEPINE SCREEN, URINE Negative Negative Negative    BUPRENORPHINEUR Negative Positive(A) Positive(A)    COCAINE SCREEN, URINE Negative Negative Negative    METHADONE SCREEN, URINE Negative Negative Negative    METHAMPHETAMINEUR Negative Positive(A) -          Brief Urine Lab Results  (Last result in the past 365 days)      Color   Clarity   Blood   Leuk Est   Nitrite   Protein   CREAT   Urine HCG        05/05/22 0601 Dark Yellow   Clear   Negative   Negative   Negative   Negative                 DATA  Labs reviewed. Glucose 132, Alkaline phosphatase 120, UA show dark yellow color, trace ketones. UDS positive for amphetamine, buprenorphine, methamphetamine, thc.   EKG reviewed. QTc 407.   SUMAYA reviewed.  Patient has an active rx for Suboxone.   Record reviewed. The patient was last here in 2018 and treated for depression, PTSD and substance use.     Strengths: Motivated for treatment    Weaknesses:Substance use and Poor coping skills    Code status:  Full  Discussed code status with patient.    ASSESSMENT & PLAN:        Opioid use disorder, severe, dependence (HCC)  - Clonidine detox  - Short buprenorphine detox      Methamphetamine use disorder, severe (HCC)  - Supportive treatment      Tetrahydrocannabinol (THC) use disorder, mild, abuse  - Supportive treatment      Nicotine use disorder  - Encourage cessation      The patient has been admitted for safety and stabilization.  Patient will be monitored for suicidality daily and maintained on Special Precautions Level 4 (q30 min checks).  The patient will have individual and group therapy with a master's level therapist. A master treatment plan will be developed and agreed upon by the patient and his/her treatment team.  The patient's estimated length of stay in the hospital is 5-7 days.

## 2022-05-06 NOTE — PLAN OF CARE
Goal Outcome Evaluation:  Plan of Care Reviewed With: patient  Patient Agreement with Plan of Care: agrees     Progress: no change  Outcome Evaluation: Pt having craving 10/10 and anxiety 8/10, denies depression, SI/HI and AVH. Reporting sleep and appetite are good. Pt isolating in room majority of shift related to complaints of withdrawal s/s.

## 2022-05-06 NOTE — PROGRESS NOTES
Navigator is helping Primary Therapist with discharge planning for patient. Navigator completed the following referrals on this day:    [facility] - [phone]  -Spoke with Cait. She will screen patient later today. Referral to be faxed when h&p is available. 5/6  -Faxed referral 5/6

## 2022-05-06 NOTE — PLAN OF CARE
Goal Outcome Evaluation:  Plan of Care Reviewed With: patient  Patient Agreement with Plan of Care: agrees     Progress: improving     Pt slept well, appetite good, and participated in group.

## 2022-05-06 NOTE — DISCHARGE PLACEMENT REQUEST
"Evelio Stephens (37 y.o. Male)             Date of Birth   1985    Social Security Number       Address   [address] [facility] [address] [place], [place] [address]    Home Phone   [phone]    MRN   [id number]       Nondenominational   None    Marital Status   Single                            Admission Date   22    Admission Type   Emergency    Admitting Provider   Franklin Adan MD    Attending Provider   Franklin Adan MD    Department, Room/Bed   [facility] ADULT CD, 1041/2S       Discharge Date       Discharge Disposition       Discharge Destination                               Attending Provider: Franklin Adan MD    Allergies: Penicillins, Benadryl [Diphenhydramine]    Isolation: None   Infection: None   Code Status: CPR   Advance Care Planning Activity    Ht: 180.3 cm (71\")   Wt: 72.1 kg (159 lb)    Admission Cmt: None   Principal Problem: None                Active Insurance as of 2022     Primary Coverage     Payor Plan Insurance Group Employer/Plan Group    [employer]HE[place] MEDICAID [place] MED[place]ID [id number]     Payor Plan Address Payor Plan Phone Number Payor Plan Fax Number Effective Dates    [address] [address] [phone]  2018 - None Entered    [facility] [id number]       Subscriber Name Subscriber Birth Date Member ID       EVELIO STEPHENS 1985 [id number]                 Emergency Contacts      (Rel.) Home Phone Work Phone Mobile Phone    konrad stephens (Mother) [phone] -- --               History & Physical      Barbi Marie MD at 22 1044                INITIAL PSYCHIATRIC HISTORY & PHYSICAL    Patient Identification:  Name:  Evelio Stephens  Age:  37 y.o.  Sex:  male  :  1985  MRN:  [id number]   Visit Number:  [id number]  Primary Care Physician:  Provider, No Known    SUBJECTIVE    CC/Focus of Exam: heroin, meth and thc use    HPI: Evelio Stephens is a 37 y.o. male who was admitted on 2022 with complaints of heroin, meth and thc use and " withdrawals. The patient reports a long history of substance use. First use was at age 13 when he started using marijuana and morphine. At age 16-16 he started using jocelyn and meth. Over time the use increased and the patient  continued to use despite negative consequences. He states he has lost everything, has no contact with family, no place to live, no job and all of this is due to his ongoing drug use. The patient endorses symptoms of tolerance and withdrawals. Has tried to cut down and stop but has not been successful. He made a statement that if he doesn't stop he will end up killing himself. No active SI/HI reported. Also denies AVH. Spends too much time and resources in pursuit of substance use. Longest period of sobriety is reported to be 2-3 days.  Currently using about a gram of heroin daily IV, a gram of meth daily IV, smokes a joint of marijuana about once a month. He states he takes a Suboxone when he cannot find heroin. He denies benzo or alcohol use.  Last use was a couple of days ago when he used Suboxone and meth. He took two Suboxone 8-2 mg tablets.   Withdrawal symptoms leg and stomach cramps, diarrhea, tremors, sweats, chills.   He denies feeling depressed but reports feeling anxious due to withdrawals.     PAST PSYCHIATRIC HX: The patient reports multiple inpatient psych admissions for depression and SI in the past. Last admission was in 2018 at the [facility] adult psych unit.     SUBSTANCE USE HX: See HPI. Smoked 1 to 1.5 ppd of cigarettes. The patient has been in treatment for opioid use at [facility] in [place] and just recently moved to another clinic and has an active script but wants to quit taking Suboxone.      SOCIAL HX:   Social History     Socioeconomic History   • Marital status: Single   • Number of children: 3   • Years of education: 7   • Highest education level: GED or equivalent   Tobacco Use   • Smoking status: Current Every Day Smoker     Packs/day: 1.50     Years:  29.00     Pack years: 43.50     Types: Cigarettes   • Smokeless tobacco: Never Used   • Tobacco comment: beagan smoking at 8 years old   Substance and Sexual Activity   • Alcohol use: No     Comment: denies use   • Drug use: Yes     Types: Benzodiazepines, Methamphetamines, Marijuana, Other     Comment: see below   • Sexual activity: Not Currently     Partners: Female         Past Medical History:   Diagnosis Date   • Anxiety    • Bipolar disorder (HCC)    • Depression    • Hyperthyroidism    • Liver disease    • PTSD (post-traumatic stress disorder)    • Substance abuse (HCC)    • Suicidal thoughts    • Suicide attempt (HCC)    • Thyroid disease           Past Surgical History:   Procedure Laterality Date   • MOUTH SURGERY         Family History   Problem Relation Age of Onset   • Alcohol abuse Father    • No Known Problems Mother    • No Known Problems Sister    • No Known Problems Brother    • No Known Problems Sister    • No Known Problems Sister    • No Known Problems Brother    • No Known Problems Brother          Medications Prior to Admission   Medication Sig Dispense Refill Last Dose   • buprenorphine-naloxone (SUBOXONE) 8-2 MG per SL tablet Place 3 tablets under the tongue Daily.   5/4/2022 at Unknown time         ALLERGIES:  Penicillins and Benadryl [diphenhydramine]    Temp:  [97.5 °F (36.4 °C)-98.7 °F (37.1 °C)] 97.5 °F (36.4 °C)  Heart Rate:  [50-75] 60  Resp:  [16-18] 16  BP: (104-134)/(59-88) 134/87    REVIEW OF SYSTEMS:  Review of Systems   Constitutional: Positive for chills, diaphoresis and fatigue.   HENT: Positive for congestion and sore throat.    Eyes: Negative.    Respiratory: Negative.    Cardiovascular: Negative.    Gastrointestinal: Positive for diarrhea.   Endocrine: Negative.    Genitourinary: Negative.    Musculoskeletal: Positive for arthralgias, back pain and myalgias.   Skin: Negative.    Allergic/Immunologic: Negative.    Neurological: Positive for tremors.   Hematological:  Negative.    Psychiatric/Behavioral: The patient is nervous/anxious.         OBJECTIVE    PHYSICAL EXAM:  Physical Exam  Constitutional:  Appears well-developed and well-nourished.   HENT:   Head: Normocephalic and atraumatic.   Right Ear: External ear normal.   Left Ear: External ear normal.   Mouth/Throat: Oropharynx is clear and moist.   Eyes: Pupils are equal, round, and reactive to light. Conjunctivae and EOM are normal.   Neck: Normal range of motion. Neck supple.   Cardiovascular: Normal rate, regular rhythm and normal heart sounds.    Respiratory: Effort normal and breath sounds normal. No respiratory distress. No wheezes.   GI: Soft. Bowel sounds are normal.No distension. There is no tenderness.   Musculoskeletal: Normal range of motion. No edema or deformity.   Neurological:No cranial nerve deficit. Coordination normal.   Skin: Skin is warm and dry. No rash noted. No erythema.       MENTAL STATUS EXAM:   Hygiene:   fair  Cooperation:  Cooperative  Eye Contact:  Fair  Psychomotor Behavior:  Appropriate  Affect:  Restricted  Hopelessness: Denies  Speech:  Normal  Goal directed  Thought Content:  Normal  Suicidal:  None  Homicidal:  None  Hallucinations:  None  Delusion:  None  Memory:  Intact  Orientation:  Person, Place, Time and Situation  Reliability:  fair  Insight:  Fair  Judgement:  Fair  Impulse Control:  Fair      Imaging Results (Last 24 Hours)     ** No results found for the last 24 hours. **           ECG/EMG Results (most recent)     Procedure Component Value Units Date/Time    ECG 12 Lead [[id number]] Collected: 05/05/22 1652     Updated: 05/05/22 1654     QT Interval 438 ms      QTC Interval 407 ms     Narrative:      Test Reason : Baseline Cardiac Status  Blood Pressure :   */*   mmHG  Vent. Rate :  52 BPM     Atrial Rate :  52 BPM     P-R Int : 132 ms          QRS Dur :  96 ms      QT Int : 438 ms       P-R-T Axes :  -7  59  61 degrees     QTc Int : 407 ms    Sinus bradycardia with sinus  arrhythmia  Otherwise normal ECG  When compared with ECG of 13-JUN-2018 01:16,  No significant change was found    Referred By: KELVIN           Confirmed By:            Lab Results   Component Value Date    GLUCOSE 132 (H) 05/05/2022    BUN 19 05/05/2022    CREATININE 0.84 05/05/2022    EGFRIFNONA 72 10/19/2018    BCR 22.6 05/05/2022    CO2 22.8 05/05/2022    CALCIUM 9.3 05/05/2022    ALBUMIN 3.83 05/05/2022    AST 37 05/05/2022    ALT 23 05/05/2022       Lab Results   Component Value Date    WBC 9.07 05/05/2022    HGB 14.4 05/05/2022    HCT 44.1 05/05/2022    MCV 87.2 05/05/2022     05/05/2022       Last Urine Toxicity     LAST URINE TOXICITY RESULTS Latest Ref Rng & Units 5/5/2022 10/19/2018    AMPHETAMINES SCREEN, URINE Negative Positive(A) Positive(A)    BARBITURATES SCREEN Negative Negative Negative    BENZODIAZEPINE SCREEN, URINE Negative Negative Negative    BUPRENORPHINEUR Negative Positive(A) Positive(A)    COCAINE SCREEN, URINE Negative Negative Negative    METHADONE SCREEN, URINE Negative Negative Negative    METHAMPHETAMINEUR Negative Positive(A) -          Brief Urine Lab Results  (Last result in the past 365 days)      Color   Clarity   Blood   Leuk Est   Nitrite   Protein   CREAT   Urine HCG        05/05/22 0601 Dark Yellow   Clear   Negative   Negative   Negative   Negative                 DATA  Labs reviewed. Glucose 132, Alkaline phosphatase 120, UA show dark yellow color, trace ketones. UDS positive for amphetamine, buprenorphine, methamphetamine, thc.   EKG reviewed. QTc 407.   SUMAYA reviewed. Patient has an active rx for Suboxone.   Record reviewed. The patient was last here in 2018 and treated for depression, PTSD and substance use.     Strengths: Motivated for treatment    Weaknesses:Substance use and Poor coping skills    Code status:  Full  Discussed code status with patient.    ASSESSMENT & PLAN:        Opioid use disorder, severe, dependence (HCC)  - Clonidine detox  - Short  buprenorphine detox      Methamphetamine use disorder, severe (HCC)  - Supportive treatment      Tetrahydrocannabinol (THC) use disorder, mild, abuse  - Supportive treatment      Nicotine use disorder  - Encourage cessation      The patient has been admitted for safety and stabilization.  Patient will be monitored for suicidality daily and maintained on Special Precautions Level 4 (q30 min checks).  The patient will have individual and group therapy with a master's level therapist. A master treatment plan will be developed and agreed upon by the patient and his/her treatment team.  The patient's estimated length of stay in the hospital is 5-7 days.             Electronically signed by Barbi Marie MD at 05/06/22 1109         Current Facility-Administered Medications   Medication Dose Route Frequency Provider Last Rate Last Admin   • acetaminophen (TYLENOL) tablet 650 mg  650 mg Oral Q6H PRN Franklin Adan MD       • aluminum-magnesium hydroxide-simethicone (MAALOX MAX) 400-400-40 MG/5ML suspension 15 mL  15 mL Oral Q6H PRN Franklin Adan MD       • benzonatate (TESSALON) capsule 100 mg  100 mg Oral TID PRN Franklin Adan MD       • benztropine (COGENTIN) tablet 2 mg  2 mg Oral Once PRN Franklin Adan MD        Or   • benztropine (COGENTIN) injection 1 mg  1 mg Intramuscular Once PRN Franklin Adan MD       • buprenorphine (SUBUTEX) SL tablet 2 mg  2 mg Sublingual BID Barbi Marie MD   2 mg at 05/06/22 1212   • cloNIDine (CATAPRES) tablet 0.1 mg  0.1 mg Oral 4x Daily PRN Franklin Adan MD       • cloNIDine (CATAPRES) tablet 0.1 mg  0.1 mg Oral 4x Daily PRN Franklin Adan MD        Followed by   • [START ON 5/7/2022] cloNIDine (CATAPRES) tablet 0.1 mg  0.1 mg Oral TID PRN Franklin Adan MD        Followed by   • [START ON 5/8/2022] cloNIDine (CATAPRES) tablet 0.1 mg  0.1 mg Oral BID PRN Franklin Adan MD        Followed by   • [START ON 5/9/2022] cloNIDine (CATAPRES) tablet 0.1 mg  0.1  mg Oral Daily PRN Franklin Adan MD       • cyclobenzaprine (FLEXERIL) tablet 10 mg  10 mg Oral TID PRN Franklin Adan MD       • dicyclomine (BENTYL) capsule 10 mg  10 mg Oral TID PRN Franklin Adan MD       • famotidine (PEPCID) tablet 20 mg  20 mg Oral BID PRN Franklin Adan MD       • hydrOXYzine (ATARAX) tablet 50 mg  50 mg Oral Q6H PRN Franklin Adan MD       • ibuprofen (ADVIL,MOTRIN) tablet 400 mg  400 mg Oral Q6H PRN Franklin Adan MD       • loperamide (IMODIUM) capsule 2 mg  2 mg Oral Q2H PRN Franklin Adan MD       • magnesium hydroxide (MILK OF MAGNESIA) suspension 10 mL  10 mL Oral Daily PRN Franklin Adan MD       • nicotine (NICODERM CQ) 21 MG/24HR patch 1 patch  1 patch Transdermal Q24H Franklin Adan MD       • ondansetron (ZOFRAN) tablet 4 mg  4 mg Oral Q6H PRN Franklin Adan MD       • sodium chloride nasal spray 2 spray  2 spray Each Nare PRN Franklin Adan MD       • traZODone (DESYREL) tablet 50 mg  50 mg Oral Nightly PRN Franklin Adan MD         Physician Progress Notes (last 24 hours)  Notes from 05/05/22 1408 through 05/06/22 1408   No notes of this type exist for this encounter.

## 2022-05-06 NOTE — PLAN OF CARE
Goal Outcome Evaluation:  Plan of Care Reviewed With: patient  Patient Agreement with Plan of Care: agrees  Consent Given to Review Plan with: [facility]  Progress: no change  Outcome Evaluation: Met with patient in the office, completing the social history assessment and reviewing the treatment plans. Patient agreeable.      Problem: Adult Behavioral Health Plan of Care  Goal: Plan of Care Review  Outcome: Ongoing, Progressing  Flowsheets (Taken 5/6/2022 0943)  Consent Given to Review Plan with: [facility]C  Progress: no change  Plan of Care Reviewed With: patient  Patient Agreement with Plan of Care: agrees  Outcome Evaluation: Met with patient in the office, completing the social history assessment and reviewing the treatment plans. Patient agreeable.     Problem: Adult Behavioral Health Plan of Care  Goal: Patient-Specific Goal (Individualization)  Outcome: Ongoing, Progressing  Flowsheets (Taken 5/6/2022 0943)  Patient Personal Strengths:   resilient   resourceful  Patient-Specific Goals (Include Timeframe): Identify 1-2 cognitive distortions  Individualized Care Needs: CBT  Anxieties, Fears or Concerns: Concerned with obtaining suboxone and being directly admitted to residential treatment  Patient Vulnerabilities:   substance abuse/addiction   occupational insecurity   poor impulse control   housing insecurity   lacks insight into illness   limited support system   family/relationship conflict     Problem: Adult Behavioral Health Plan of Care  Goal: Optimized Coping Skills in Response to Life Stressors  Outcome: Ongoing, Progressing  Flowsheets (Taken 5/6/2022 0943)  Optimized Coping Skills in Response to Life Stressors: making progress toward outcome     Problem: Adult Behavioral Health Plan of Care  Goal: Optimized Coping Skills in Response to Life Stressors  Intervention: Promote Effective Coping Strategies  Flowsheets (Taken 5/6/2022 0943)  Supportive Measures:   active listening utilized   self-responsibility promoted    positive reinforcement provided   verbalization of feelings encouraged   problem-solving facilitated   counseling provided   self-reflection promoted     Problem: Adult Behavioral Health Plan of Care  Goal: Develops/Participates in Therapeutic [place] to Support Successful Transition  Outcome: Ongoing, Progressing  Flowsheets (Taken 5/6/2022 0943)  Develops/Participates in Therapeutic [place] to Support Successful Transition: making progress toward outcome     Problem: Adult Behavioral Health Plan of Care  Goal: Develops/Participates in Therapeutic [place] to Support Successful Transition  Intervention: Foster Therapeutic [place]  Flowsheets (Taken 5/6/2022 0943)  Trust Relationship/Rapport:   care explained   reassurance provided   emotional support provided   choices provided   empathic listening provided   thoughts/feelings acknowledged   questions answered   questions encouraged     Problem: Adult Behavioral Health Plan of Care  Goal: Develops/Participates in Therapeutic [place] to Support Successful Transition  Intervention: Mutually Develop Transition Plan  Flowsheets (Taken 5/6/2022 0943)  Outpatient/Agency/Support Group Needs:   12 step program (specify)   residential services   support group(s) (specify)  Discharge Coordination/Progress: [facility]  Transition Support: follow-up care discussed  Transportation Anticipated: agency  Anticipated Discharge Disposition: residential substance use unit  Transportation Concerns: none  Current Discharge Risk:   substance use/abuse   homeless  Concerns to be Addressed:   mental health   discharge planning   substance/tobacco abuse/use   coping/stress   homelessness  Readmission Within the Last 30 Days: no previous admission in last 30 days  Patient/Family Anticipated Services at Transition: rehabilitation services  Patient's Choice of Community Agency(s): [facility]  Patient/Family Anticipates Transition to: inpatient rehabilitation facility  Offered/Gave Vendor List: yes        [phone]  D: Patient is a 37-year-old  male currently residing in [place] where he has been homeless for the past year.  Patient has 7th  grade education.  He is currently unemployed, having last worked 1 year ago at a [employer].  He lost his job because of transportation issues.  Patient identified himself as an addict, reporting his drugs of choice were methamphetamine and heroin.  Patient reported having been participating in a Suboxone MAT program in [place] for a short while.  He had his prescription Suboxone with him, and he was upset that it was not being administered in the hospital.  Therapist encouraged patient to speak with the doctor about this.  Patient agreeable.  Regarding aftercare, the patient planned to begin a residential treatment program.  He requested to be sent to a program farther away from this area.  He also requested a 9-month program, preferring longer term care.  Patient was agreeable to the therapist's suggestion of addiction recovery care.  NADEGE signed.    A: Patient's affect appeared agitated.  His mood appeared irritable.  He was polite and cooperative, despite the level of discomfort he was experiencing related to withdrawals.  He seemed to have a good degree of motivation to change, desiring direct admission to residential treatment.  However, he also seemed somewhat preoccupied with Suboxone.    P: Patient has been placed on a detox regimen.  He will be monitored routinely for his safety.  He will be provided with individual and group therapy.  He will follow-up with [facility], and a referral will be submitted on his behalf today.  Patient is also willing to complete a phone screening today if possible.

## 2022-05-07 PROCEDURE — 99232 SBSQ HOSP IP/OBS MODERATE 35: CPT | Performed by: PSYCHIATRY & NEUROLOGY

## 2022-05-07 RX ADMIN — BUPRENORPHINE HCL 2 MG: 2 TABLET SUBLINGUAL at 08:22

## 2022-05-07 RX ADMIN — BUPRENORPHINE HCL 2 MG: 2 TABLET SUBLINGUAL at 21:16

## 2022-05-07 RX ADMIN — LOPERAMIDE HYDROCHLORIDE 2 MG: 2 CAPSULE ORAL at 21:16

## 2022-05-07 NOTE — PLAN OF CARE
Goal Outcome Evaluation:  Plan of Care Reviewed With: patient  Patient Agreement with Plan of Care: agrees     Progress: improving     Pt slept well. Appetite is good, and refused group   .

## 2022-05-07 NOTE — PLAN OF CARE
Goal Outcome Evaluation:  Plan of Care Reviewed With: patient  Patient Agreement with Plan of Care: agrees     Progress: improving  Outcome Evaluation: Patient spent most of the day isolating in his room. Rates anxiety and depression 6/10. Cravings 5/10. Wd's chills, stomach cramps, diarrhea, and nausea. Denies SI, HI, or Crews. No other issues noted at this time. Will continue to monitor.

## 2022-05-07 NOTE — PROGRESS NOTES
"INPATIENT PSYCHIATRIC PROGRESS NOTE    Name:  Jabari Telles  :  1985  MRN:  [id number]  Visit Number:  [id number]  Length of stay:  2    SUBJECTIVE    CC/Focus of Exam: Opiate dependence    INTERVAL HISTORY:  First time seeing patient.  Chart, notes, vitals, labs and EKG personally reviewed.    Patient reports symptoms of myalgias, back pain, anxiety, insomnia, abdominal pain, nausea, chills and fatigue.  He is taking as needed medication for comfort.  Discussed changes to medication regimen, but patient prefers to complete clonidine detox at this time.    Depression rating 6/10  Anxiety rating 6/10  Sleep: Poor  Withdrawal sx: Per HPI  Cravin/10    Review of Systems   Constitutional: Positive for chills.   Respiratory: Negative.    Cardiovascular: Negative.    Gastrointestinal: Positive for abdominal pain, diarrhea and nausea.   Musculoskeletal: Positive for myalgias.   Psychiatric/Behavioral: Positive for dysphoric mood and sleep disturbance. The patient is nervous/anxious.        OBJECTIVE    Temp:  [97.2 °F (36.2 °C)-97.8 °F (36.6 °C)] 97.8 °F (36.6 °C)  Heart Rate:  [55-78] 78  Resp:  [16-18] 16  BP: (101-137)/(55-87) 116/61    MENTAL STATUS EXAM:  Appearance: Casually dressed, fair hygeine.   Cooperation: Cooperative  Psychomotor: Mild psychomotor agitation/retardation, No EPS, No motor tics  Speech: normal rate, amount.  Mood: \"Rough\"   Affect: congruent, restricted  Thought Content: goal directed, no delusional material present  Thought process: linear, organized.  Suicidality: No SI  Homicidality: No HI  Perception: No AH/VH  Insight: fair   Judgment: fair    Lab Results (last 24 hours)     ** No results found for the last 24 hours. **             Imaging Results (Last 24 Hours)     ** No results found for the last 24 hours. **             ECG/EMG Results (most recent)     Procedure Component Value Units Date/Time    ECG 12 Lead [[id number]] Collected: 22 1652     Updated: 22 1249     " QT Interval 438 ms      QTC Interval 407 ms     Narrative:      Test Reason : Baseline Cardiac Status  Blood Pressure :   */*   mmHG  Vent. Rate :  52 BPM     Atrial Rate :  52 BPM     P-R Int : 132 ms          QRS Dur :  96 ms      QT Int : 438 ms       P-R-T Axes :  -7  59  61 degrees     QTc Int : 407 ms    Sinus bradycardia with sinus arrhythmia  early repolarization  Otherwise normal ECG  When compared with ECG of 2018 01:16,  No significant change was found  Confirmed by George oJhn () on 2022 12:48:53 PM    Referred By: KELVIN           Confirmed By: George John           ALLERGIES: Penicillins and Benadryl [diphenhydramine]      Current Facility-Administered Medications:   •  acetaminophen (TYLENOL) tablet 650 mg, 650 mg, Oral, Q6H PRN, Franklin Adan MD  •  aluminum-magnesium hydroxide-simethicone (MAALOX MAX) 400-400-40 MG/5ML suspension 15 mL, 15 mL, Oral, Q6H PRN, Franklin Adan MD  •  benzonatate (TESSALON) capsule 100 mg, 100 mg, Oral, TID PRN, Franklin Adan MD  •  benztropine (COGENTIN) tablet 2 mg, 2 mg, Oral, Once PRN **OR** benztropine (COGENTIN) injection 1 mg, 1 mg, Intramuscular, Once PRN, Franklin Adan MD  •  buprenorphine (SUBUTEX) SL tablet 2 mg, 2 mg, Sublingual, BID, Barbi Marie MD, 2 mg at 22 0822  •  [] cloNIDine (CATAPRES) tablet 0.1 mg, 0.1 mg, Oral, 4x Daily PRN, 0.1 mg at 22 1434 **FOLLOWED BY** cloNIDine (CATAPRES) tablet 0.1 mg, 0.1 mg, Oral, TID PRN **FOLLOWED BY** [START ON 2022] cloNIDine (CATAPRES) tablet 0.1 mg, 0.1 mg, Oral, BID PRN **FOLLOWED BY** [START ON 2022] cloNIDine (CATAPRES) tablet 0.1 mg, 0.1 mg, Oral, Daily PRN, Franklin Adan MD  •  cyclobenzaprine (FLEXERIL) tablet 10 mg, 10 mg, Oral, TID PRN, Franklin Adan MD, 10 mg at 22 1428  •  dicyclomine (BENTYL) capsule 10 mg, 10 mg, Oral, TID PRN, Franklin Adan MD, 10 mg at 22 1428  •  famotidine (PEPCID) tablet 20 mg, 20 mg,  Oral, BID PRN, Franklin Adan MD  •  hydrOXYzine (ATARAX) tablet 50 mg, 50 mg, Oral, Q6H PRN, Franklin Adan MD, 50 mg at 05/06/22 1428  •  ibuprofen (ADVIL,MOTRIN) tablet 400 mg, 400 mg, Oral, Q6H PRN, Franklin Adan MD, 400 mg at 05/06/22 1428  •  loperamide (IMODIUM) capsule 2 mg, 2 mg, Oral, Q2H PRN, Franklin Adan MD, 2 mg at 05/06/22 1428  •  magnesium hydroxide (MILK OF MAGNESIA) suspension 10 mL, 10 mL, Oral, Daily PRN, Franklin Adan MD  •  nicotine (NICODERM CQ) 21 MG/24HR patch 1 patch, 1 patch, Transdermal, Q24H, Franklin Adan MD  •  ondansetron (ZOFRAN) tablet 4 mg, 4 mg, Oral, Q6H PRN, Franklin Adan MD, 4 mg at 05/06/22 1428  •  sodium chloride nasal spray 2 spray, 2 spray, Each Nare, PRN, Franklin Adan MD  •  traZODone (DESYREL) tablet 50 mg, 50 mg, Oral, Nightly PRN, Franklin Adan MD    Reviewed chart, notes, vitals, labs and EKG personally reviewed.    ASSESSMENT & PLAN:        Opioid use disorder, severe, dependence (HCC)    Methamphetamine use disorder, severe (HCC)    Tetrahydrocannabinol (THC) use disorder, mild, abuse    Nicotine use disorder      Opioid use disorder, severe, dependence (HCC)  - Clonidine detox  - Short buprenorphine detox       Methamphetamine use disorder, severe (HCC)  - Supportive treatment       Tetrahydrocannabinol (THC) use disorder, mild, abuse  - Supportive treatment       Nicotine use disorder  - Encourage cessation        The patient has been admitted for safety and stabilization.  Patient will be monitored for suicidality daily and maintained on Special Precautions Level 4 (q30 min checks).  The patient will have individual and group therapy with a master's level therapist. A master treatment plan will be developed and agreed upon by the patient and his/her treatment team.  The patient's estimated length of stay in the hospital is 5-7 days.     Special precautions: Special Precautions Level 4 (q30 min checks)    Behavioral Health  Treatment Plan and Problem List: I have reviewed and approved the Behavioral Health Treatment Plan and Problem list.  The patient has had a chance to review and agrees with the treatment plan.     Clinician:  Jg Monreal MD  05/07/22  10:36 EDT

## 2022-05-08 VITALS
DIASTOLIC BLOOD PRESSURE: 107 MMHG | WEIGHT: 159 LBS | HEIGHT: 71 IN | SYSTOLIC BLOOD PRESSURE: 150 MMHG | BODY MASS INDEX: 22.26 KG/M2 | TEMPERATURE: 98 F | OXYGEN SATURATION: 98 % | HEART RATE: 106 BPM | RESPIRATION RATE: 18 BRPM

## 2022-05-08 PROCEDURE — 99239 HOSP IP/OBS DSCHRG MGMT >30: CPT | Performed by: PSYCHIATRY & NEUROLOGY

## 2022-05-08 RX ADMIN — BUPRENORPHINE HCL 2 MG: 2 TABLET SUBLINGUAL at 08:06

## 2022-05-08 NOTE — DISCHARGE SUMMARY
"      PSYCHIATRIC DISCHARGE SUMMARY     Patient Identification:  Name:  Jabari Telles  Age:  37 y.o.  Sex:  male  :  1985  MRN:  [id number]  Visit Number:  [id number]    Date of Admission:2022   Date of Discharge:  2022    Discharge Diagnosis:  Active Problems:    Opioid use disorder, severe, dependence (HCC)    Methamphetamine use disorder, severe (HCC)    Tetrahydrocannabinol (THC) use disorder, mild, abuse    Nicotine use disorder      Admission Diagnosis:  Polysubstance abuse (HCC) [F19.10]     Hospital Course  Patient is a 37 y.o. male presented with opioid dependence and polysubstance abuse.  Admitted for medically assisted detox.  Admission UDS positive for methamphetamine, amphetamine, buprenorphine and THC.  Patient currently prescribed Suboxone.  Placed on clonidine detox protocol and provided brief buprenorphine supplemental detox dosing.  Patient was uncomfortable but progressing through a detox protocol until today, when he demanded to leave AMA.  We discussed other options for detox, including readjusting buprenorphine protocol and other comfort medication, but patient was adamant about being discharged.  He denied acutely concerning symptoms that would justify an involuntary hold.  Patient was informed that he would be discharged AMA and was strongly encouraged to consider additional treatment options if he continues with discharge, to which she agreed.    On the day of discharge, patient denied SI, HI or AVH. Patient was stable, denying significant symptoms of mood, psychotic or thought disorder.  Patient electing to leave AMA.    Mental Status Exam upon discharge:   Mood \"ready to go\"   Affect-congruent, restricted  Thought Content-goal directed, no delusional material present  Thought process-linear, organized.  Suicidality: No SI  Homicidality: No HI  Perception: No AH/VH    Procedures Performed         Consults:   Consults     No orders found from 2022 to 2022.    "       Pertinent Test Results:   Lab Results (last 7 days)     ** No results found for the last 168 hours. **          Condition on Discharge:  stable    Vital Signs  Temp:  [97.3 °F (36.3 °C)-98.5 °F (36.9 °C)] 98 °F (36.7 °C)  Heart Rate:  [] 106  Resp:  [16-18] 18  BP: (115-150)/() 150/107    Discharge Disposition:  Left Against Medical Advice    Discharge Medications:     Discharge Medications      Stop These Medications    buprenorphine-naloxone 8-2 MG per SL tablet  Commonly known as: SUBOXONE            Discharge Diet: Normal    Activity at Discharge: Normal    Follow-up Appointments  No future appointments.      Test Results Pending at Discharge  None     Time: I spent greater than 30 minutes on this discharge activity which included: face-to-face encounter with the patient, reviewing the data in the system, coordination of the care with the nursing staff as well as consultants, documentation, and entering orders.      Clinician:   Jg Monreal MD  05/08/22  09:56 EDT

## 2022-05-08 NOTE — PLAN OF CARE
Goal Outcome Evaluation:  Plan of Care Reviewed With: patient  Patient Agreement with Plan of Care: agrees        Outcome Evaluation: pt reports his craing is an 8. withdrawal include leg cramps abd cramps and diarrhea

## 2022-05-08 NOTE — NURSING NOTE
"Pt refused all comfort meds, stating he \"should have\" his suboxone that he \"came in with.\" Explained he will need to talk to the doctor about getting it prescribed.  "
Patient requested to leave AMA. Dr. Monreal aware and put AMA order in. Patient says he wants to leave to go visit his mother for Mother's Day and that the medication is not helping him. Patient educated on the risks/benefits of leaving AMA. Patient states, he understands and is still ready to leave. Explained to patient that leaving AMA could possibly place his health or life at risk. Patient still says he is ready to discharge.  
Seeking help to detox and go to rehab. Reports using IV meth and heroin- 1 to 1.5 grams per day. He is prescribed Suboxone 8mg TID but does not use appropriately. He has been using Suboxone when feeling sick- taking 1/4 to 1/2 piece per day. He has bruising to left upper arm, but it is difficult to identify any IV track marks d/t tattoos. He has been homeless, unable to access food, he has no transportation and has to walk everywhere. Today he walked from [place] to the ER to get help. He reports his feet and knees are sore/ache rating discomfort at 7/10. He reports IV use since age 13. He states he has recently had 2-3 accidental overdoses that has prompted him to get help. Anxiety rated 8/10, depression 0/10 and craving 10/10. He denies SI/HI, A/V hallucinations or delusions.  
confusion

## 2022-05-09 NOTE — PAYOR COMM NOTE
"Evelio Stephens (37 y.o. Male)             Date of Birth   1985    Social Security Number       Address   [address] [place], [place] [address]    Home Phone   [phone]    MRN   [id number]       Adventism   None    Marital Status   Single                            Admission Date   5/5/22    Admission Type   Emergency    Admitting Provider   Franklin Adan MD    Attending Provider       Department, Room/Bed   [facility] ADULT CD, 1041/2S       Discharge Date   5/8/2022    Discharge Disposition   Left Against Medical Advice    Discharge Destination                               Attending Provider: (none)   Allergies: Penicillins, Benadryl [Diphenhydramine]    Isolation: None   Infection: None   Code Status: Prior   Advance Care Planning Activity    Ht: 180.3 cm (71\")   Wt: 72.1 kg (159 lb)    Admission Cmt: None   Principal Problem: None                Active Insurance as of 5/5/2022     Primary Coverage     Payor Plan Insurance Group Employer/Plan Group    ANTHEM MEDICAID ANTHEM MEDICAID [id number]     Payor Plan Address Payor Plan Phone Number Payor Plan Fax Number Effective Dates    [address] [address] [phone]  4/1/2018 - None Entered    [facility] [id number]       Subscriber Name Subscriber Birth Date Member ID       EVELIO STEPHENS 1985 [id number]                 Emergency Contacts      (Rel.) Home Phone Work Phone Mobile Phone    konrad stephens (Mother) [phone] -- --          PLEASE ATTACH THIS DISCHARGE INFORMATION TO AUTH. # [id number].   DISCHARGE DATE: 05/08/2022 (LEFT AMA)    DISCHARGE DIAGNOSIS CODES:  Opioid use disorder, severe, dependence (F11.20)  Methamphetamine use disorder, severe (F15.20)  Tetrahydrocannabinol (THC) use disorder, mild, abuse (F12.10)           Discharge Summary      Jg Monreal MD at 05/08/22 0956                PSYCHIATRIC DISCHARGE SUMMARY     Patient Identification:  Name:  Evelio Stephens  Age:  37 y.o.  Sex:  " "male  :  1985  MRN:  [id number]  Visit Number:  [id number]    Date of Admission:2022   Date of Discharge:  2022    Discharge Diagnosis:  Active Problems:    Opioid use disorder, severe, dependence (HCC)    Methamphetamine use disorder, severe (HCC)    Tetrahydrocannabinol (THC) use disorder, mild, abuse    Nicotine use disorder      Admission Diagnosis:  Polysubstance abuse (HCC) [F19.10]     Hospital Course  Patient is a 37 y.o. male presented with opioid dependence and polysubstance abuse.  Admitted for medically assisted detox.  Admission UDS positive for methamphetamine, amphetamine, buprenorphine and THC.  Patient currently prescribed Suboxone.  Placed on clonidine detox protocol and provided brief buprenorphine supplemental detox dosing.  Patient was uncomfortable but progressing through a detox protocol until today, when he demanded to leave AMA.  We discussed other options for detox, including readjusting buprenorphine protocol and other comfort medication, but patient was adamant about being discharged.  He denied acutely concerning symptoms that would justify an involuntary hold.  Patient was informed that he would be discharged AMA and was strongly encouraged to consider additional treatment options if he continues with discharge, to which she agreed.    On the day of discharge, patient denied SI, HI or AVH. Patient was stable, denying significant symptoms of mood, psychotic or thought disorder.  Patient electing to leave AMA.    Mental Status Exam upon discharge:   Mood \"ready to go\"   Affect-congruent, restricted  Thought Content-goal directed, no delusional material present  Thought process-linear, organized.  Suicidality: No SI  Homicidality: No HI  Perception: No AH/VH    Procedures Performed         Consults:   Consults     No orders found from 2022 to 2022.          Pertinent Test Results:   Lab Results (last 7 days)     ** No results found for the last 168 hours. **    "       Condition on Discharge:  stable    Vital Signs  Temp:  [97.3 °F (36.3 °C)-98.5 °F (36.9 °C)] 98 °F (36.7 °C)  Heart Rate:  [] 106  Resp:  [16-18] 18  BP: (115-150)/() 150/107    Discharge Disposition:  Left Against Medical Advice    Discharge Medications:     Discharge Medications      Stop These Medications    buprenorphine-naloxone 8-2 MG per SL tablet  Commonly known as: SUBOXONE            Discharge Diet: Normal    Activity at Discharge: Normal    Follow-up Appointments  No future appointments.      Test Results Pending at Discharge  None     Time: I spent greater than 30 minutes on this discharge activity which included: face-to-face encounter with the patient, reviewing the data in the system, coordination of the care with the nursing staff as well as consultants, documentation, and entering orders.      Clinician:   Jg Monreal MD  05/08/22  09:56 EDT    Electronically signed by Jg Monreal MD at 05/08/22 1002

## 2023-07-07 PROBLEM — F11.10 OPIATE ABUSE, CONTINUOUS: Status: ACTIVE | Noted: 2023-07-07

## 2024-05-11 ENCOUNTER — APPOINTMENT (OUTPATIENT)
Dept: GENERAL RADIOLOGY | Facility: HOSPITAL | Age: 39
End: 2024-05-11
Payer: MEDICAID

## 2024-05-11 ENCOUNTER — HOSPITAL ENCOUNTER (EMERGENCY)
Facility: HOSPITAL | Age: 39
Discharge: HOME | End: 2024-05-11
Attending: EMERGENCY MEDICINE
Payer: MEDICAID

## 2024-05-11 ENCOUNTER — APPOINTMENT (OUTPATIENT)
Dept: ULTRASOUND IMAGING | Facility: HOSPITAL | Age: 39
End: 2024-05-11
Payer: MEDICAID

## 2024-05-11 VITALS
OXYGEN SATURATION: 97 % | RESPIRATION RATE: 18 BRPM | HEIGHT: 71 IN | DIASTOLIC BLOOD PRESSURE: 65 MMHG | HEART RATE: 69 BPM | WEIGHT: 207 LBS | SYSTOLIC BLOOD PRESSURE: 122 MMHG | BODY MASS INDEX: 28.98 KG/M2 | TEMPERATURE: 97.8 F

## 2024-05-11 DIAGNOSIS — R60.0 LOWER EXTREMITY EDEMA: Primary | ICD-10-CM

## 2024-05-11 LAB
ALBUMIN SERPL-MCNC: 4.2 G/DL (ref 3.5–5.2)
ALBUMIN/GLOB SERPL: 1.3 G/DL
ALP SERPL-CCNC: 96 U/L (ref 39–117)
ALT SERPL W P-5'-P-CCNC: 96 U/L (ref 1–41)
ANION GAP SERPL CALCULATED.3IONS-SCNC: 11.3 MMOL/L (ref 5–15)
AST SERPL-CCNC: 73 U/L (ref 1–40)
BASOPHILS # BLD AUTO: 0.07 10*3/MM3 (ref 0–0.2)
BASOPHILS NFR BLD AUTO: 0.8 % (ref 0–1.5)
BILIRUB SERPL-MCNC: 0.5 MG/DL (ref 0–1.2)
BUN SERPL-MCNC: 14 MG/DL (ref 6–20)
BUN/CREAT SERPL: 13.7 (ref 7–25)
CALCIUM SPEC-SCNC: 9.6 MG/DL (ref 8.6–10.5)
CHLORIDE SERPL-SCNC: 100 MMOL/L (ref 98–107)
CO2 SERPL-SCNC: 25.7 MMOL/L (ref 22–29)
CREAT SERPL-MCNC: 1.02 MG/DL (ref 0.76–1.27)
D DIMER PPP FEU-MCNC: 3.26 MCGFEU/ML (ref 0–0.5)
DEPRECATED RDW RBC AUTO: 42.7 FL (ref 37–54)
EGFRCR SERPLBLD CKD-EPI 2021: 95.3 ML/MIN/1.73
EOSINOPHIL # BLD AUTO: 0.3 10*3/MM3 (ref 0–0.4)
EOSINOPHIL NFR BLD AUTO: 3.5 % (ref 0.3–6.2)
ERYTHROCYTE [DISTWIDTH] IN BLOOD BY AUTOMATED COUNT: 13.2 % (ref 12.3–15.4)
GLOBULIN UR ELPH-MCNC: 3.3 GM/DL
GLUCOSE SERPL-MCNC: 109 MG/DL (ref 65–99)
HCT VFR BLD AUTO: 48.4 % (ref 37.5–51)
HGB BLD-MCNC: 16.2 G/DL (ref 13–17.7)
HOLD SPECIMEN: NORMAL
HOLD SPECIMEN: NORMAL
IMM GRANULOCYTES # BLD AUTO: 0.02 10*3/MM3 (ref 0–0.05)
IMM GRANULOCYTES NFR BLD AUTO: 0.2 % (ref 0–0.5)
LYMPHOCYTES # BLD AUTO: 4.06 10*3/MM3 (ref 0.7–3.1)
LYMPHOCYTES NFR BLD AUTO: 47.8 % (ref 19.6–45.3)
MCH RBC QN AUTO: 29.6 PG (ref 26.6–33)
MCHC RBC AUTO-ENTMCNC: 33.5 G/DL (ref 31.5–35.7)
MCV RBC AUTO: 88.3 FL (ref 79–97)
MONOCYTES # BLD AUTO: 1.04 10*3/MM3 (ref 0.1–0.9)
MONOCYTES NFR BLD AUTO: 12.2 % (ref 5–12)
NEUTROPHILS NFR BLD AUTO: 3.01 10*3/MM3 (ref 1.7–7)
NEUTROPHILS NFR BLD AUTO: 35.5 % (ref 42.7–76)
NRBC BLD AUTO-RTO: 0 /100 WBC (ref 0–0.2)
NT-PROBNP SERPL-MCNC: 82.1 PG/ML (ref 0–450)
PLATELET # BLD AUTO: 350 10*3/MM3 (ref 140–450)
PMV BLD AUTO: 9.6 FL (ref 6–12)
POTASSIUM SERPL-SCNC: 4.1 MMOL/L (ref 3.5–5.2)
PROT SERPL-MCNC: 7.5 G/DL (ref 6–8.5)
RBC # BLD AUTO: 5.48 10*6/MM3 (ref 4.14–5.8)
SODIUM SERPL-SCNC: 137 MMOL/L (ref 136–145)
TROPONIN T SERPL HS-MCNC: 8 NG/L
WBC NRBC COR # BLD AUTO: 8.5 10*3/MM3 (ref 3.4–10.8)
WHOLE BLOOD HOLD COAG: NORMAL
WHOLE BLOOD HOLD SPECIMEN: NORMAL

## 2024-05-11 PROCEDURE — 99284 EMERGENCY DEPT VISIT MOD MDM: CPT

## 2024-05-11 PROCEDURE — 85379 FIBRIN DEGRADATION QUANT: CPT | Performed by: EMERGENCY MEDICINE

## 2024-05-11 PROCEDURE — 85025 COMPLETE CBC W/AUTO DIFF WBC: CPT

## 2024-05-11 PROCEDURE — 80053 COMPREHEN METABOLIC PANEL: CPT

## 2024-05-11 PROCEDURE — 93005 ELECTROCARDIOGRAM TRACING: CPT

## 2024-05-11 PROCEDURE — 93970 EXTREMITY STUDY: CPT | Performed by: RADIOLOGY

## 2024-05-11 PROCEDURE — 71045 X-RAY EXAM CHEST 1 VIEW: CPT | Performed by: RADIOLOGY

## 2024-05-11 PROCEDURE — 84484 ASSAY OF TROPONIN QUANT: CPT

## 2024-05-11 PROCEDURE — 83880 ASSAY OF NATRIURETIC PEPTIDE: CPT

## 2024-05-11 PROCEDURE — 36415 COLL VENOUS BLD VENIPUNCTURE: CPT

## 2024-05-11 PROCEDURE — 93970 EXTREMITY STUDY: CPT

## 2024-05-11 PROCEDURE — 71045 X-RAY EXAM CHEST 1 VIEW: CPT

## 2024-05-11 RX ORDER — BUPRENORPHINE HYDROCHLORIDE AND NALOXONE HYDROCHLORIDE DIHYDRATE 8; 2 MG/1; MG/1
1 TABLET SUBLINGUAL DAILY
COMMUNITY

## 2024-05-11 RX ORDER — LEVOTHYROXINE SODIUM 0.03 MG/1
25 TABLET ORAL
COMMUNITY

## 2024-05-11 RX ORDER — FUROSEMIDE 20 MG/1
20 TABLET ORAL DAILY
Qty: 30 TABLET | Refills: 0 | Status: SHIPPED | OUTPATIENT
Start: 2024-05-11 | End: 2024-06-10

## 2024-05-11 NOTE — ED NOTES
MEDICAL SCREENING:    Reason for Visit: shortness of breath, BLE edema x one week    Patient initially seen in triage.  The patient was advised further evaluation and diagnostic testing will be needed, some of the treatment and testing will be initiated in the lobby in order to begin the process.  The patient will be returned to the waiting area for the time being and possibly be re-assessed by a subsequent ED provider.  The patient will be brought back to the treatment area in as timely manner as possible.     Ton Ahmadi, APRN  05/11/24 1939

## 2024-05-12 LAB
QT INTERVAL: 386 MS
QTC INTERVAL: 407 MS

## 2024-05-12 NOTE — DISCHARGE INSTRUCTIONS
Take Lasix as prescribed.    Follow-up with your primary care doctor in the next 1 week.    Return to the emergency department for worsening pain, swelling, fever, chills, sweats, shortness of breath, nausea, vomiting, altered mental status, or any other concerning signs or symptoms.
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
## 2024-06-16 ENCOUNTER — HOSPITAL ENCOUNTER (EMERGENCY)
Facility: HOSPITAL | Age: 39
Discharge: ANOTHER HOSPITAL | End: 2024-06-16
Attending: STUDENT IN AN ORGANIZED HEALTH CARE EDUCATION/TRAINING PROGRAM | Admitting: STUDENT IN AN ORGANIZED HEALTH CARE EDUCATION/TRAINING PROGRAM
Payer: MEDICAID

## 2024-06-16 VITALS
HEART RATE: 91 BPM | HEIGHT: 71 IN | SYSTOLIC BLOOD PRESSURE: 178 MMHG | TEMPERATURE: 97.5 F | BODY MASS INDEX: 28.7 KG/M2 | RESPIRATION RATE: 14 BRPM | OXYGEN SATURATION: 99 % | DIASTOLIC BLOOD PRESSURE: 87 MMHG | WEIGHT: 205 LBS

## 2024-06-16 DIAGNOSIS — R45.851 SUICIDAL IDEATION: Primary | ICD-10-CM

## 2024-06-16 LAB
ALBUMIN SERPL-MCNC: 4.5 G/DL (ref 3.5–5.2)
ALBUMIN/GLOB SERPL: 1.5 G/DL
ALP SERPL-CCNC: 125 U/L (ref 39–117)
ALT SERPL W P-5'-P-CCNC: 194 U/L (ref 1–41)
AMPHET+METHAMPHET UR QL: POSITIVE
AMPHETAMINES UR QL: POSITIVE
ANION GAP SERPL CALCULATED.3IONS-SCNC: 9.8 MMOL/L (ref 5–15)
AST SERPL-CCNC: 126 U/L (ref 1–40)
BARBITURATES UR QL SCN: NEGATIVE
BASOPHILS # BLD AUTO: 0.06 10*3/MM3 (ref 0–0.2)
BASOPHILS NFR BLD AUTO: 0.6 % (ref 0–1.5)
BENZODIAZ UR QL SCN: NEGATIVE
BILIRUB SERPL-MCNC: 0.5 MG/DL (ref 0–1.2)
BILIRUB UR QL STRIP: NEGATIVE
BUN SERPL-MCNC: 14 MG/DL (ref 6–20)
BUN/CREAT SERPL: 17.1 (ref 7–25)
BUPRENORPHINE SERPL-MCNC: POSITIVE NG/ML
CALCIUM SPEC-SCNC: 9.2 MG/DL (ref 8.6–10.5)
CANNABINOIDS SERPL QL: POSITIVE
CHLORIDE SERPL-SCNC: 103 MMOL/L (ref 98–107)
CLARITY UR: CLEAR
CO2 SERPL-SCNC: 24.2 MMOL/L (ref 22–29)
COCAINE UR QL: NEGATIVE
COLOR UR: YELLOW
CREAT SERPL-MCNC: 0.82 MG/DL (ref 0.76–1.27)
DEPRECATED RDW RBC AUTO: 41.4 FL (ref 37–54)
EGFRCR SERPLBLD CKD-EPI 2021: 113.9 ML/MIN/1.73
EOSINOPHIL # BLD AUTO: 0.06 10*3/MM3 (ref 0–0.4)
EOSINOPHIL NFR BLD AUTO: 0.6 % (ref 0.3–6.2)
ERYTHROCYTE [DISTWIDTH] IN BLOOD BY AUTOMATED COUNT: 12.8 % (ref 12.3–15.4)
ETHANOL BLD-MCNC: <10 MG/DL (ref 0–10)
ETHANOL UR QL: <0.01 %
FENTANYL UR-MCNC: NEGATIVE NG/ML
GLOBULIN UR ELPH-MCNC: 3 GM/DL
GLUCOSE SERPL-MCNC: 111 MG/DL (ref 65–99)
GLUCOSE UR STRIP-MCNC: NEGATIVE MG/DL
HCT VFR BLD AUTO: 49.7 % (ref 37.5–51)
HGB BLD-MCNC: 16.2 G/DL (ref 13–17.7)
HGB UR QL STRIP.AUTO: NEGATIVE
IMM GRANULOCYTES # BLD AUTO: 0.03 10*3/MM3 (ref 0–0.05)
IMM GRANULOCYTES NFR BLD AUTO: 0.3 % (ref 0–0.5)
KETONES UR QL STRIP: NEGATIVE
LEUKOCYTE ESTERASE UR QL STRIP.AUTO: NEGATIVE
LYMPHOCYTES # BLD AUTO: 2.5 10*3/MM3 (ref 0.7–3.1)
LYMPHOCYTES NFR BLD AUTO: 23.7 % (ref 19.6–45.3)
MAGNESIUM SERPL-MCNC: 2.2 MG/DL (ref 1.6–2.6)
MCH RBC QN AUTO: 28.8 PG (ref 26.6–33)
MCHC RBC AUTO-ENTMCNC: 32.6 G/DL (ref 31.5–35.7)
MCV RBC AUTO: 88.4 FL (ref 79–97)
METHADONE UR QL SCN: NEGATIVE
MONOCYTES # BLD AUTO: 1.11 10*3/MM3 (ref 0.1–0.9)
MONOCYTES NFR BLD AUTO: 10.5 % (ref 5–12)
NEUTROPHILS NFR BLD AUTO: 6.8 10*3/MM3 (ref 1.7–7)
NEUTROPHILS NFR BLD AUTO: 64.3 % (ref 42.7–76)
NITRITE UR QL STRIP: NEGATIVE
NRBC BLD AUTO-RTO: 0 /100 WBC (ref 0–0.2)
OPIATES UR QL: NEGATIVE
OXYCODONE UR QL SCN: NEGATIVE
PCP UR QL SCN: NEGATIVE
PH UR STRIP.AUTO: 7.5 [PH] (ref 5–8)
PLATELET # BLD AUTO: 279 10*3/MM3 (ref 140–450)
PMV BLD AUTO: 9.9 FL (ref 6–12)
POTASSIUM SERPL-SCNC: 4.3 MMOL/L (ref 3.5–5.2)
PROT SERPL-MCNC: 7.5 G/DL (ref 6–8.5)
PROT UR QL STRIP: ABNORMAL
RBC # BLD AUTO: 5.62 10*6/MM3 (ref 4.14–5.8)
SODIUM SERPL-SCNC: 137 MMOL/L (ref 136–145)
SP GR UR STRIP: 1.02 (ref 1–1.03)
TRICYCLICS UR QL SCN: NEGATIVE
UROBILINOGEN UR QL STRIP: ABNORMAL
WBC NRBC COR # BLD AUTO: 10.56 10*3/MM3 (ref 3.4–10.8)

## 2024-06-16 PROCEDURE — 81003 URINALYSIS AUTO W/O SCOPE: CPT | Performed by: PHYSICIAN ASSISTANT

## 2024-06-16 PROCEDURE — 36415 COLL VENOUS BLD VENIPUNCTURE: CPT

## 2024-06-16 PROCEDURE — 93005 ELECTROCARDIOGRAM TRACING: CPT | Performed by: STUDENT IN AN ORGANIZED HEALTH CARE EDUCATION/TRAINING PROGRAM

## 2024-06-16 PROCEDURE — 82077 ASSAY SPEC XCP UR&BREATH IA: CPT | Performed by: PHYSICIAN ASSISTANT

## 2024-06-16 PROCEDURE — 80053 COMPREHEN METABOLIC PANEL: CPT | Performed by: PHYSICIAN ASSISTANT

## 2024-06-16 PROCEDURE — 80307 DRUG TEST PRSMV CHEM ANLYZR: CPT | Performed by: PHYSICIAN ASSISTANT

## 2024-06-16 PROCEDURE — 85025 COMPLETE CBC W/AUTO DIFF WBC: CPT | Performed by: PHYSICIAN ASSISTANT

## 2024-06-16 PROCEDURE — 83735 ASSAY OF MAGNESIUM: CPT | Performed by: PHYSICIAN ASSISTANT

## 2024-06-16 PROCEDURE — 99285 EMERGENCY DEPT VISIT HI MDM: CPT

## 2024-06-16 RX ORDER — CLONIDINE HYDROCHLORIDE 0.1 MG/1
0.2 TABLET ORAL ONCE
Status: COMPLETED | OUTPATIENT
Start: 2024-06-16 | End: 2024-06-16

## 2024-06-16 RX ORDER — ACETAMINOPHEN 500 MG
1000 TABLET ORAL ONCE
Status: COMPLETED | OUTPATIENT
Start: 2024-06-16 | End: 2024-06-16

## 2024-06-16 RX ADMIN — ACETAMINOPHEN 1000 MG: 500 TABLET ORAL at 12:11

## 2024-06-16 RX ADMIN — CLONIDINE HYDROCHLORIDE 0.2 MG: 0.1 TABLET ORAL at 12:45

## 2024-06-16 NOTE — NURSING NOTE
Patient presents reporting suicidal ideation with a plan to overdose. He reports he attempted to overdose yesterday on fentanyl and meth but someone gave him narcan. He reports its been one year ago since his mother passed away and this has got him very depressed. He also reports his brother is missing and he is concerned about him. He reports poor sleep and poor appetite. He reports taking suboxone as prescribed and denies any other substance use at this time. He rates anxiety and depression 10/10.

## 2024-06-16 NOTE — NURSING NOTE
Spoke with Dr. Charles. He feels patient needs to be admitted, due to this facility not having beds the patient will need to be transferred. New order to transfer pt at this time. Rbovx2.
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
## 2024-06-19 LAB
QT INTERVAL: 358 MS
QTC INTERVAL: 426 MS

## 2024-10-11 ENCOUNTER — HOSPITAL ENCOUNTER (INPATIENT)
Facility: HOSPITAL | Age: 39
LOS: 4 days | Discharge: HOME | DRG: 897 | End: 2024-10-15
Attending: PSYCHIATRY & NEUROLOGY | Admitting: PSYCHIATRY & NEUROLOGY
Payer: MEDICAID

## 2024-10-11 ENCOUNTER — HOSPITAL ENCOUNTER (EMERGENCY)
Facility: HOSPITAL | Age: 39
Discharge: STILL ADMITTED OR ADMINISTRATIVE | DRG: 897 | End: 2024-10-11
Attending: STUDENT IN AN ORGANIZED HEALTH CARE EDUCATION/TRAINING PROGRAM
Payer: MEDICAID

## 2024-10-11 VITALS
TEMPERATURE: 97.9 F | SYSTOLIC BLOOD PRESSURE: 148 MMHG | DIASTOLIC BLOOD PRESSURE: 93 MMHG | HEART RATE: 93 BPM | BODY MASS INDEX: 26.32 KG/M2 | OXYGEN SATURATION: 98 % | HEIGHT: 71 IN | WEIGHT: 188 LBS | RESPIRATION RATE: 18 BRPM

## 2024-10-11 DIAGNOSIS — F19.10 SUBSTANCE ABUSE: Primary | ICD-10-CM

## 2024-10-11 PROBLEM — F11.20 OPIATE ADDICTION: Status: ACTIVE | Noted: 2024-10-11

## 2024-10-11 LAB
ALBUMIN SERPL-MCNC: 3.4 G/DL (ref 3.5–5.2)
ALBUMIN/GLOB SERPL: 1.1 G/DL
ALP SERPL-CCNC: 93 U/L (ref 39–117)
ALT SERPL W P-5'-P-CCNC: 87 U/L (ref 1–41)
AMPHET+METHAMPHET UR QL: POSITIVE
AMPHETAMINES UR QL: POSITIVE
ANION GAP SERPL CALCULATED.3IONS-SCNC: 12.9 MMOL/L (ref 5–15)
AST SERPL-CCNC: 91 U/L (ref 1–40)
BARBITURATES UR QL SCN: NEGATIVE
BASOPHILS # BLD AUTO: 0.05 10*3/MM3 (ref 0–0.2)
BASOPHILS NFR BLD AUTO: 0.8 % (ref 0–1.5)
BENZODIAZ UR QL SCN: NEGATIVE
BILIRUB SERPL-MCNC: 0.6 MG/DL (ref 0–1.2)
BILIRUB UR QL STRIP: NEGATIVE
BUN SERPL-MCNC: 8 MG/DL (ref 6–20)
BUN/CREAT SERPL: 8.9 (ref 7–25)
BUPRENORPHINE SERPL-MCNC: POSITIVE NG/ML
CALCIUM SPEC-SCNC: 8.6 MG/DL (ref 8.6–10.5)
CANNABINOIDS SERPL QL: POSITIVE
CHLORIDE SERPL-SCNC: 99 MMOL/L (ref 98–107)
CLARITY UR: CLEAR
CO2 SERPL-SCNC: 23.1 MMOL/L (ref 22–29)
COCAINE UR QL: NEGATIVE
COLOR UR: YELLOW
CREAT SERPL-MCNC: 0.9 MG/DL (ref 0.76–1.27)
DEPRECATED RDW RBC AUTO: 43.8 FL (ref 37–54)
EGFRCR SERPLBLD CKD-EPI 2021: 110.7 ML/MIN/1.73
EOSINOPHIL # BLD AUTO: 0.09 10*3/MM3 (ref 0–0.4)
EOSINOPHIL NFR BLD AUTO: 1.4 % (ref 0.3–6.2)
ERYTHROCYTE [DISTWIDTH] IN BLOOD BY AUTOMATED COUNT: 13.6 % (ref 12.3–15.4)
ETHANOL BLD-MCNC: <10 MG/DL (ref 0–10)
ETHANOL UR QL: <0.01 %
FENTANYL UR-MCNC: POSITIVE NG/ML
GLOBULIN UR ELPH-MCNC: 3.1 GM/DL
GLUCOSE SERPL-MCNC: 166 MG/DL (ref 65–99)
GLUCOSE UR STRIP-MCNC: NEGATIVE MG/DL
HCT VFR BLD AUTO: 48 % (ref 37.5–51)
HGB BLD-MCNC: 15.8 G/DL (ref 13–17.7)
HGB UR QL STRIP.AUTO: NEGATIVE
IMM GRANULOCYTES # BLD AUTO: 0.01 10*3/MM3 (ref 0–0.05)
IMM GRANULOCYTES NFR BLD AUTO: 0.2 % (ref 0–0.5)
KETONES UR QL STRIP: NEGATIVE
LEUKOCYTE ESTERASE UR QL STRIP.AUTO: NEGATIVE
LYMPHOCYTES # BLD AUTO: 1.35 10*3/MM3 (ref 0.7–3.1)
LYMPHOCYTES NFR BLD AUTO: 21.7 % (ref 19.6–45.3)
MAGNESIUM SERPL-MCNC: 2.2 MG/DL (ref 1.6–2.6)
MCH RBC QN AUTO: 28.4 PG (ref 26.6–33)
MCHC RBC AUTO-ENTMCNC: 32.9 G/DL (ref 31.5–35.7)
MCV RBC AUTO: 86.3 FL (ref 79–97)
METHADONE UR QL SCN: NEGATIVE
MONOCYTES # BLD AUTO: 0.62 10*3/MM3 (ref 0.1–0.9)
MONOCYTES NFR BLD AUTO: 10 % (ref 5–12)
NEUTROPHILS NFR BLD AUTO: 4.11 10*3/MM3 (ref 1.7–7)
NEUTROPHILS NFR BLD AUTO: 65.9 % (ref 42.7–76)
NITRITE UR QL STRIP: NEGATIVE
NRBC BLD AUTO-RTO: 0 /100 WBC (ref 0–0.2)
OPIATES UR QL: NEGATIVE
OXYCODONE UR QL SCN: NEGATIVE
PCP UR QL SCN: NEGATIVE
PH UR STRIP.AUTO: 6 [PH] (ref 5–8)
PLATELET # BLD AUTO: 271 10*3/MM3 (ref 140–450)
PMV BLD AUTO: 9.7 FL (ref 6–12)
POTASSIUM SERPL-SCNC: 3.4 MMOL/L (ref 3.5–5.2)
PROT SERPL-MCNC: 6.5 G/DL (ref 6–8.5)
PROT UR QL STRIP: NEGATIVE
RBC # BLD AUTO: 5.56 10*6/MM3 (ref 4.14–5.8)
SODIUM SERPL-SCNC: 135 MMOL/L (ref 136–145)
SP GR UR STRIP: 1.02 (ref 1–1.03)
TRICYCLICS UR QL SCN: NEGATIVE
UROBILINOGEN UR QL STRIP: NORMAL
WBC NRBC COR # BLD AUTO: 6.23 10*3/MM3 (ref 3.4–10.8)

## 2024-10-11 PROCEDURE — 85025 COMPLETE CBC W/AUTO DIFF WBC: CPT | Performed by: PHYSICIAN ASSISTANT

## 2024-10-11 PROCEDURE — 81003 URINALYSIS AUTO W/O SCOPE: CPT | Performed by: PHYSICIAN ASSISTANT

## 2024-10-11 PROCEDURE — 80053 COMPREHEN METABOLIC PANEL: CPT | Performed by: PHYSICIAN ASSISTANT

## 2024-10-11 PROCEDURE — 93010 ELECTROCARDIOGRAM REPORT: CPT | Performed by: INTERNAL MEDICINE

## 2024-10-11 PROCEDURE — HZ2ZZZZ DETOXIFICATION SERVICES FOR SUBSTANCE ABUSE TREATMENT: ICD-10-PCS | Performed by: PSYCHIATRY & NEUROLOGY

## 2024-10-11 PROCEDURE — 80307 DRUG TEST PRSMV CHEM ANLYZR: CPT | Performed by: PHYSICIAN ASSISTANT

## 2024-10-11 PROCEDURE — 99285 EMERGENCY DEPT VISIT HI MDM: CPT

## 2024-10-11 PROCEDURE — 93005 ELECTROCARDIOGRAM TRACING: CPT | Performed by: PSYCHIATRY & NEUROLOGY

## 2024-10-11 PROCEDURE — 36415 COLL VENOUS BLD VENIPUNCTURE: CPT

## 2024-10-11 PROCEDURE — 83735 ASSAY OF MAGNESIUM: CPT | Performed by: PHYSICIAN ASSISTANT

## 2024-10-11 PROCEDURE — 82077 ASSAY SPEC XCP UR&BREATH IA: CPT | Performed by: PHYSICIAN ASSISTANT

## 2024-10-11 RX ORDER — LISINOPRIL 10 MG/1
10 TABLET ORAL DAILY
Status: DISCONTINUED | OUTPATIENT
Start: 2024-10-11 | End: 2024-10-15 | Stop reason: HOSPADM

## 2024-10-11 RX ORDER — HYDROXYZINE HYDROCHLORIDE 50 MG/1
50 TABLET, FILM COATED ORAL EVERY 6 HOURS PRN
Status: DISCONTINUED | OUTPATIENT
Start: 2024-10-11 | End: 2024-10-15 | Stop reason: HOSPADM

## 2024-10-11 RX ORDER — CLONIDINE HYDROCHLORIDE 0.1 MG/1
0.1 TABLET ORAL 3 TIMES DAILY PRN
Status: ACTIVE | OUTPATIENT
Start: 2024-10-13 | End: 2024-10-14

## 2024-10-11 RX ORDER — CLONIDINE HYDROCHLORIDE 0.1 MG/1
0.1 TABLET ORAL 2 TIMES DAILY PRN
Status: ACTIVE | OUTPATIENT
Start: 2024-10-14 | End: 2024-10-15

## 2024-10-11 RX ORDER — LEVOTHYROXINE SODIUM 25 UG/1
50 TABLET ORAL DAILY
Status: DISCONTINUED | OUTPATIENT
Start: 2024-10-11 | End: 2024-10-15 | Stop reason: HOSPADM

## 2024-10-11 RX ORDER — IBUPROFEN 400 MG/1
400 TABLET, FILM COATED ORAL EVERY 6 HOURS PRN
Status: DISCONTINUED | OUTPATIENT
Start: 2024-10-11 | End: 2024-10-15 | Stop reason: HOSPADM

## 2024-10-11 RX ORDER — CLONIDINE HYDROCHLORIDE 0.1 MG/1
0.1 TABLET ORAL 4 TIMES DAILY PRN
Status: ACTIVE | OUTPATIENT
Start: 2024-10-12 | End: 2024-10-13

## 2024-10-11 RX ORDER — GABAPENTIN 600 MG/1
600 TABLET ORAL 3 TIMES DAILY
COMMUNITY
End: 2024-10-15 | Stop reason: HOSPADM

## 2024-10-11 RX ORDER — BUPRENORPHINE HYDROCHLORIDE AND NALOXONE HYDROCHLORIDE DIHYDRATE 8; 2 MG/1; MG/1
2 TABLET SUBLINGUAL DAILY
Status: CANCELLED | OUTPATIENT
Start: 2024-10-11

## 2024-10-11 RX ORDER — PANTOPRAZOLE SODIUM 40 MG/1
40 TABLET, DELAYED RELEASE ORAL DAILY
Status: DISCONTINUED | OUTPATIENT
Start: 2024-10-11 | End: 2024-10-15 | Stop reason: HOSPADM

## 2024-10-11 RX ORDER — ACETAMINOPHEN 325 MG/1
650 TABLET ORAL EVERY 6 HOURS PRN
Status: DISCONTINUED | OUTPATIENT
Start: 2024-10-11 | End: 2024-10-15 | Stop reason: HOSPADM

## 2024-10-11 RX ORDER — PANTOPRAZOLE SODIUM 40 MG/1
40 TABLET, DELAYED RELEASE ORAL DAILY
Status: ON HOLD | COMMUNITY
End: 2024-10-15

## 2024-10-11 RX ORDER — CLONIDINE HYDROCHLORIDE 0.1 MG/1
0.1 TABLET ORAL ONCE AS NEEDED
Status: DISCONTINUED | OUTPATIENT
Start: 2024-10-15 | End: 2024-10-15 | Stop reason: HOSPADM

## 2024-10-11 RX ORDER — BENZTROPINE MESYLATE 1 MG/ML
1 INJECTION, SOLUTION INTRAMUSCULAR; INTRAVENOUS ONCE AS NEEDED
Status: DISCONTINUED | OUTPATIENT
Start: 2024-10-11 | End: 2024-10-15 | Stop reason: HOSPADM

## 2024-10-11 RX ORDER — LEVOTHYROXINE SODIUM 50 UG/1
50 TABLET ORAL DAILY
Status: ON HOLD | COMMUNITY
End: 2024-10-15

## 2024-10-11 RX ORDER — POLYETHYLENE GLYCOL 3350 17 G/17G
17 POWDER, FOR SOLUTION ORAL DAILY PRN
Status: DISCONTINUED | OUTPATIENT
Start: 2024-10-11 | End: 2024-10-15 | Stop reason: HOSPADM

## 2024-10-11 RX ORDER — CYCLOBENZAPRINE HCL 10 MG
10 TABLET ORAL 3 TIMES DAILY PRN
Status: DISCONTINUED | OUTPATIENT
Start: 2024-10-11 | End: 2024-10-15 | Stop reason: HOSPADM

## 2024-10-11 RX ORDER — FAMOTIDINE 20 MG/1
20 TABLET, FILM COATED ORAL 2 TIMES DAILY PRN
Status: DISCONTINUED | OUTPATIENT
Start: 2024-10-11 | End: 2024-10-15 | Stop reason: HOSPADM

## 2024-10-11 RX ORDER — ECHINACEA PURPUREA EXTRACT 125 MG
2 TABLET ORAL AS NEEDED
Status: DISCONTINUED | OUTPATIENT
Start: 2024-10-11 | End: 2024-10-15 | Stop reason: HOSPADM

## 2024-10-11 RX ORDER — TRAZODONE HYDROCHLORIDE 50 MG/1
50 TABLET, FILM COATED ORAL NIGHTLY PRN
Status: DISCONTINUED | OUTPATIENT
Start: 2024-10-11 | End: 2024-10-15 | Stop reason: HOSPADM

## 2024-10-11 RX ORDER — BENZTROPINE MESYLATE 1 MG/1
2 TABLET ORAL ONCE AS NEEDED
Status: DISCONTINUED | OUTPATIENT
Start: 2024-10-11 | End: 2024-10-15 | Stop reason: HOSPADM

## 2024-10-11 RX ORDER — BENZONATATE 100 MG/1
100 CAPSULE ORAL 3 TIMES DAILY PRN
Status: DISCONTINUED | OUTPATIENT
Start: 2024-10-11 | End: 2024-10-15 | Stop reason: HOSPADM

## 2024-10-11 RX ORDER — LOPERAMIDE HCL 2 MG
2 CAPSULE ORAL
Status: DISCONTINUED | OUTPATIENT
Start: 2024-10-11 | End: 2024-10-15 | Stop reason: HOSPADM

## 2024-10-11 RX ORDER — HYDRALAZINE HYDROCHLORIDE 25 MG/1
25 TABLET, FILM COATED ORAL DAILY PRN
Status: DISCONTINUED | OUTPATIENT
Start: 2024-10-11 | End: 2024-10-15 | Stop reason: HOSPADM

## 2024-10-11 RX ORDER — GABAPENTIN 300 MG/1
600 CAPSULE ORAL EVERY 8 HOURS SCHEDULED
Status: DISCONTINUED | OUTPATIENT
Start: 2024-10-11 | End: 2024-10-15 | Stop reason: HOSPADM

## 2024-10-11 RX ORDER — CLONIDINE HYDROCHLORIDE 0.1 MG/1
0.1 TABLET ORAL 4 TIMES DAILY PRN
Status: DISPENSED | OUTPATIENT
Start: 2024-10-11 | End: 2024-10-12

## 2024-10-11 RX ORDER — LISINOPRIL 10 MG/1
10 TABLET ORAL DAILY
Status: ON HOLD | COMMUNITY
End: 2024-10-15

## 2024-10-11 RX ORDER — DICYCLOMINE HYDROCHLORIDE 10 MG/1
10 CAPSULE ORAL 3 TIMES DAILY PRN
Status: DISCONTINUED | OUTPATIENT
Start: 2024-10-11 | End: 2024-10-15 | Stop reason: HOSPADM

## 2024-10-11 RX ORDER — ALUMINA, MAGNESIA, AND SIMETHICONE 2400; 2400; 240 MG/30ML; MG/30ML; MG/30ML
15 SUSPENSION ORAL EVERY 6 HOURS PRN
Status: DISCONTINUED | OUTPATIENT
Start: 2024-10-11 | End: 2024-10-15 | Stop reason: HOSPADM

## 2024-10-11 RX ORDER — ONDANSETRON 4 MG/1
4 TABLET, ORALLY DISINTEGRATING ORAL EVERY 6 HOURS PRN
Status: DISCONTINUED | OUTPATIENT
Start: 2024-10-11 | End: 2024-10-15 | Stop reason: HOSPADM

## 2024-10-11 RX ADMIN — TRAZODONE HYDROCHLORIDE 50 MG: 50 TABLET ORAL at 21:29

## 2024-10-11 RX ADMIN — LISINOPRIL 10 MG: 10 TABLET ORAL at 17:23

## 2024-10-11 RX ADMIN — HYDROXYZINE HYDROCHLORIDE 50 MG: 50 TABLET, FILM COATED ORAL at 17:22

## 2024-10-11 RX ADMIN — LEVOTHYROXINE SODIUM 50 MCG: 0.03 TABLET ORAL at 17:23

## 2024-10-11 RX ADMIN — GABAPENTIN 600 MG: 300 CAPSULE ORAL at 17:22

## 2024-10-11 RX ADMIN — GABAPENTIN 600 MG: 300 CAPSULE ORAL at 21:28

## 2024-10-11 RX ADMIN — CYCLOBENZAPRINE 10 MG: 10 TABLET, FILM COATED ORAL at 21:29

## 2024-10-11 RX ADMIN — PANTOPRAZOLE SODIUM 40 MG: 40 TABLET, DELAYED RELEASE ORAL at 17:23

## 2024-10-11 RX ADMIN — IBUPROFEN 400 MG: 400 TABLET, FILM COATED ORAL at 21:28

## 2024-10-11 NOTE — PLAN OF CARE
"Goal Outcome Evaluation:  Plan of Care Reviewed With: patient  Patient Agreement with Plan of Care: agrees     Progress: no change  Outcome Evaluation: Patient came into the ED to detox from meth and heroin. Patient states he snorts or shoots up $60-$80 a day of Heroin and \"a little\" Meth. Last use was last night around 8:30 pm. He reports that he has used for years and wants to get help and into a rehab and a place to live. Patient rates anxiety 8/10. Denies SI/HI/AVH. Denies alcohol use. Appetite-good. Sleep-poor. Patient reports he was in a rehab a couple months ago but he could not get his meds so he had to leave. COWS 11.                               "
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
## 2024-10-11 NOTE — NURSING NOTE
Patient presents to intake requesting detox from meth and heroin. He reports that he uses about a half to a gm a day. Last use was last ngiht around 830 pm. He reports that he has used for years and wants to get help and into a rehab and a place to live.      Patient denies SI HI or AVH    Anxiety and depression 10/10     COWS 14     Patient denies etoh or benzo usage.    He reports that has not been sleeping and is tired of just having to get dope to feel good. Last tx was a couple months ago but he states that the rehab he was at could not get his meds for him and so he had to leave and did not stay.

## 2024-10-11 NOTE — NURSING NOTE
Patient to intake. Safety checks initiated.  pockets emptied and search completed with two staff members present. Items logged and placed in cabinet in intake area. Pt placed in safe room for evaluation. Will continue to monitor pt status. Waiting for ED provider to clear pt medically.      2 pocket knives in security envelope to be taken to security.

## 2024-10-12 LAB
QT INTERVAL: 412 MS
QTC INTERVAL: 412 MS

## 2024-10-12 RX ORDER — BUPRENORPHINE 2 MG/1
2 TABLET SUBLINGUAL DAILY
Status: DISCONTINUED | OUTPATIENT
Start: 2024-10-16 | End: 2024-10-15 | Stop reason: HOSPADM

## 2024-10-12 RX ORDER — BUPRENORPHINE 2 MG/1
2 TABLET SUBLINGUAL 3 TIMES DAILY
Status: COMPLETED | OUTPATIENT
Start: 2024-10-13 | End: 2024-10-14

## 2024-10-12 RX ORDER — BUPRENORPHINE 2 MG/1
2 TABLET SUBLINGUAL 4 TIMES DAILY
Status: COMPLETED | OUTPATIENT
Start: 2024-10-12 | End: 2024-10-13

## 2024-10-12 RX ORDER — BUPRENORPHINE 2 MG/1
2 TABLET SUBLINGUAL 2 TIMES DAILY
Status: COMPLETED | OUTPATIENT
Start: 2024-10-14 | End: 2024-10-15

## 2024-10-12 RX ADMIN — GABAPENTIN 600 MG: 300 CAPSULE ORAL at 06:09

## 2024-10-12 RX ADMIN — BUPRENORPHINE HCL 2 MG: 2 TABLET SUBLINGUAL at 17:58

## 2024-10-12 RX ADMIN — GABAPENTIN 600 MG: 300 CAPSULE ORAL at 21:08

## 2024-10-12 RX ADMIN — HYDROXYZINE HYDROCHLORIDE 50 MG: 50 TABLET, FILM COATED ORAL at 21:08

## 2024-10-12 RX ADMIN — LEVOTHYROXINE SODIUM 50 MCG: 0.03 TABLET ORAL at 09:31

## 2024-10-12 RX ADMIN — TRAZODONE HYDROCHLORIDE 50 MG: 50 TABLET ORAL at 21:08

## 2024-10-12 RX ADMIN — BUPRENORPHINE HCL 2 MG: 2 TABLET SUBLINGUAL at 21:08

## 2024-10-12 RX ADMIN — CLONIDINE HYDROCHLORIDE 0.1 MG: 0.1 TABLET ORAL at 12:07

## 2024-10-12 RX ADMIN — GABAPENTIN 600 MG: 300 CAPSULE ORAL at 14:56

## 2024-10-12 RX ADMIN — LISINOPRIL 10 MG: 10 TABLET ORAL at 09:31

## 2024-10-12 RX ADMIN — PANTOPRAZOLE SODIUM 40 MG: 40 TABLET, DELAYED RELEASE ORAL at 09:31

## 2024-10-12 NOTE — PLAN OF CARE
"Goal Outcome Evaluation:  Plan of Care Reviewed With: patient  Plan of Care Reviewed With: patient  Patient Agreement with Plan of Care: agrees     Progress: improving  Outcome Evaluation: Pt reports depression 3/10, anxiety 10/10, craving 10/10. Reports nausea, stomach and leg cramps with sweats. Denies SI/HI/AVH.    Pt has spent much of the shift in bed appearing to sleep. Pt received PAS dose Clonidine this shift. Reports feeling \"just sick all over\". Pt started on Subutex this evening.                              "

## 2024-10-12 NOTE — PLAN OF CARE
Goal Outcome Evaluation:  Plan of Care Reviewed With: patient  Patient Agreement with Plan of Care: agrees     Pt rates anxiety 8/10, depression 8/10, and cravings 10/10. Appetite is good. Pt given Flexeril, Motrin, and Trazodone prn medications.
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
## 2024-10-13 PROCEDURE — 99232 SBSQ HOSP IP/OBS MODERATE 35: CPT | Performed by: PSYCHIATRY & NEUROLOGY

## 2024-10-13 RX ADMIN — BUPRENORPHINE HCL 2 MG: 2 TABLET SUBLINGUAL at 21:23

## 2024-10-13 RX ADMIN — PANTOPRAZOLE SODIUM 40 MG: 40 TABLET, DELAYED RELEASE ORAL at 08:34

## 2024-10-13 RX ADMIN — BUPRENORPHINE HCL 2 MG: 2 TABLET SUBLINGUAL at 12:42

## 2024-10-13 RX ADMIN — LISINOPRIL 10 MG: 10 TABLET ORAL at 08:34

## 2024-10-13 RX ADMIN — BUPRENORPHINE HCL 2 MG: 2 TABLET SUBLINGUAL at 08:35

## 2024-10-13 RX ADMIN — HYDROXYZINE HYDROCHLORIDE 50 MG: 50 TABLET, FILM COATED ORAL at 08:35

## 2024-10-13 RX ADMIN — CYCLOBENZAPRINE 10 MG: 10 TABLET, FILM COATED ORAL at 08:35

## 2024-10-13 RX ADMIN — HYDROXYZINE HYDROCHLORIDE 50 MG: 50 TABLET, FILM COATED ORAL at 21:23

## 2024-10-13 RX ADMIN — GABAPENTIN 600 MG: 300 CAPSULE ORAL at 21:23

## 2024-10-13 RX ADMIN — DICYCLOMINE HYDROCHLORIDE 10 MG: 10 CAPSULE ORAL at 08:35

## 2024-10-13 RX ADMIN — GABAPENTIN 600 MG: 300 CAPSULE ORAL at 06:15

## 2024-10-13 RX ADMIN — GABAPENTIN 600 MG: 300 CAPSULE ORAL at 14:48

## 2024-10-13 RX ADMIN — TRAZODONE HYDROCHLORIDE 50 MG: 50 TABLET ORAL at 21:23

## 2024-10-13 RX ADMIN — LEVOTHYROXINE SODIUM 50 MCG: 0.03 TABLET ORAL at 08:34

## 2024-10-13 NOTE — PROGRESS NOTES
"   Detox Recovery Unit Progress Note   Clinician: Jhonatan Charles MD  Admission Date: 10/11/2024  07:53 EDT 10/13/24    Behavioral Health Treatment Plan and Problem List: I have reviewed and approved the Behavioral Health Treatment Plan and Problem list.    Allergies  Allergies   Allergen Reactions    Penicillins Anaphylaxis and Rash    Benadryl [Diphenhydramine] Rash       Hospital Day: 2 days      Assessment completed within view of staff    History  CC: withdrawal symptoms    Interval HPI: Patient seen and evaluated by me.  Chart reviewed. Patient is withdrawing from opiates.   Current withdrawal symptoms include:  leg cramping, anxiety, restlessness, abdominal pain, headache, sweats.   ROS otherwise as below.      Review of Systems   Constitutional:  Positive for activity change, appetite change, chills and diaphoresis.   Eyes: Negative.    Respiratory: Negative.     Cardiovascular: Negative.    Gastrointestinal:  Positive for abdominal pain.   Endocrine: Negative.    Genitourinary: Negative.    Musculoskeletal:  Positive for myalgias.   Skin: Negative.    Allergic/Immunologic: Negative.    Neurological:  Positive for headaches.   Hematological: Negative.    Psychiatric/Behavioral:  Positive for sleep disturbance. The patient is nervous/anxious.         /72 (BP Location: Right arm, Patient Position: Lying)   Pulse 63   Temp 97.8 °F (36.6 °C) (Temporal)   Resp 20   Ht 180.3 cm (71\")   Wt 84.4 kg (186 lb)   SpO2 98%   BMI 25.94 kg/m²     Mental Status Exam  Mood: anxious and dysphoric  Affect: mood-congruent   Thought Processes:  linear  Oriented X3:  yes  Thought Content: sensorium intact   Suicidal Thoughts: denies  Homicidal Thoughts: denies        Medical Decision Making:   Labs:     Lab Results (last 24 hours)       ** No results found for the last 24 hours. **              Radiology:     Imaging Results (Last 24 Hours)       ** No results found for the last 24 hours. **         "      EKG:     ECG/EMG Results (most recent)       Procedure Component Value Units Date/Time    ECG 12 Lead Other; Baseline Cardiac Status [[id number]] Collected: 10/11/24 1724     Updated: 10/12/24 1408     QT Interval 412 ms      QTC Interval 412 ms     Narrative:      Test Reason : Other~  Blood Pressure :   */*   mmHG  Vent. Rate :  60 BPM     Atrial Rate :  60 BPM     P-R Int : 136 ms          QRS Dur : 100 ms      QT Int : 412 ms       P-R-T Axes :  37  49  63 degrees     QTc Int : 412 ms    Normal sinus rhythm  Normal ECG  When compared with ECG of 16-JUN-2024 12:49,  No significant change was found  Confirmed by Marlin Amaya ([id number]) on 10/12/2024 1:57:38 PM    Referred By:            Confirmed By: Marlin Amaya             Medications:  buprenorphine, 2 mg, Sublingual, 4x Daily   Followed by  buprenorphine, 2 mg, Sublingual, TID   Followed by  [START ON 10/14/2024] buprenorphine, 2 mg, Sublingual, BID   Followed by  [START ON 10/16/2024] buprenorphine, 2 mg, Sublingual, Daily  gabapentin, 600 mg, Oral, Q8H  levothyroxine, 50 mcg, Oral, Daily  lisinopril, 10 mg, Oral, Daily  pantoprazole, 40 mg, Oral, Daily           All medications reviewed.      Assessment and Plan:    Opiate Use Disorder, Severe  Opiate withdrawal  - Clonidine detox protocol with comfort meds.  - Subutex detox protocol     H/o IV drug use  - Screen for hepatitis pending      Hypertension  - lisinopril     H/o hypothyroidism  - continue synthroid  - TSH pending        Continue hospitalization for medical management of drug withdrawal and patient safety and stabilization.  Continue individual and group chemical dependency counseling.   Therapist and treatment team will continue to assist patient in establishing plans for follow-up and rehabilitation that will serve as the next step in care once patient has completed the medical detox.    This note was generated by a charibCorey granados. The work documented in this note was completed,  reviewed, and approved by the attending psychiatrist as designated Dr. Jhonatan Charles electronic signature.     I, Jhonatan Charles MD, personally performed the services described in this documentation as scribed by the above named individual and is both accurate and complete as of 10/13/2024.

## 2024-10-13 NOTE — PLAN OF CARE
Goal Outcome Evaluation:  Plan of Care Reviewed With: patient  Plan of Care Reviewed With: patient  Patient Agreement with Plan of Care: agrees     Progress: improving  Outcome Evaluation: Pt has been more interactive with peers and staff today. Denies depression. Reports anxiety 7/10, craving 7/10. Subjective reports of mild nausea, sweats, and tremors. Denies SI/HI/AVH. Pt received prn symptom meds of flexeril, bentyl, and hydroxyzine with improvement of symptoms.

## 2024-10-13 NOTE — PLAN OF CARE
Goal Outcome Evaluation:  Plan of Care Reviewed With: patient  Plan of Care Reviewed With: patient  Patient Agreement with Plan of Care: agrees     Progress: improving  Outcome Evaluation: Pt rated anxiety 8/10, depression 0/10, and cravings 9/10.  Pt denies SI/HI/AVH. Pt was given prn atarax and trazodone. Pt reported poor appetite and fair sleep. Pt did not voice any other concerns at this time.

## 2024-10-14 PROCEDURE — 99232 SBSQ HOSP IP/OBS MODERATE 35: CPT | Performed by: PSYCHIATRY & NEUROLOGY

## 2024-10-14 RX ADMIN — HYDROXYZINE HYDROCHLORIDE 50 MG: 50 TABLET, FILM COATED ORAL at 08:26

## 2024-10-14 RX ADMIN — PANTOPRAZOLE SODIUM 40 MG: 40 TABLET, DELAYED RELEASE ORAL at 08:26

## 2024-10-14 RX ADMIN — GABAPENTIN 600 MG: 300 CAPSULE ORAL at 06:26

## 2024-10-14 RX ADMIN — DICYCLOMINE HYDROCHLORIDE 10 MG: 10 CAPSULE ORAL at 08:26

## 2024-10-14 RX ADMIN — LISINOPRIL 10 MG: 10 TABLET ORAL at 08:26

## 2024-10-14 RX ADMIN — LEVOTHYROXINE SODIUM 50 MCG: 0.03 TABLET ORAL at 08:26

## 2024-10-14 RX ADMIN — GABAPENTIN 600 MG: 300 CAPSULE ORAL at 14:50

## 2024-10-14 RX ADMIN — TRAZODONE HYDROCHLORIDE 50 MG: 50 TABLET ORAL at 21:13

## 2024-10-14 RX ADMIN — BUPRENORPHINE HCL 2 MG: 2 TABLET SUBLINGUAL at 21:13

## 2024-10-14 RX ADMIN — GABAPENTIN 600 MG: 300 CAPSULE ORAL at 21:13

## 2024-10-14 RX ADMIN — HYDROXYZINE HYDROCHLORIDE 50 MG: 50 TABLET, FILM COATED ORAL at 21:14

## 2024-10-14 RX ADMIN — IBUPROFEN 400 MG: 400 TABLET, FILM COATED ORAL at 08:26

## 2024-10-14 RX ADMIN — BUPRENORPHINE HCL 2 MG: 2 TABLET SUBLINGUAL at 08:26

## 2024-10-14 RX ADMIN — BUPRENORPHINE HCL 2 MG: 2 TABLET SUBLINGUAL at 15:00

## 2024-10-14 NOTE — PLAN OF CARE
Goal Outcome Evaluation:        Problem: Adult Behavioral Health Plan of Care  Goal: Patient-Specific Goal (Individualization)  Outcome: Progressing  Flowsheets  Taken 10/12/2024 1047 by Pura Bernal CSW  Patient/Family-Specific Goals (Include Timeframe): Patient will identify 2-3 coping skills, complete aftercare plans, address relapse prevention methods, and deny SI/HI prior to discharge in 1-7 days. Patient's long term goal is to complete residential rehab program.  Individualized Care Needs: Therapist to offer 1-4 therapy sessions, aftercare planning, safety planning, family education, group therapy, and brief CBT/MI interventions.  Taken 10/12/2024 1043 by Pura Bernal CSW  Patient Personal Strengths:   resourceful   resilient   motivated for treatment   motivated for recovery   positive educational history  Patient Vulnerabilities:   history of unsuccessful treatment   poor impulse control   occupational insecurity   limited support system   housing insecurity   substance abuse/addiction   lacks insight into illness  Taken 10/11/2024 1553 by Cathy Adan RN  Anxieties, Fears or Concerns: none voiced  Goal: Optimized Coping Skills in Response to Life Stressors  Outcome: Progressing  Intervention: Promote Effective Coping Strategies  Flowsheets (Taken 10/14/2024 1012)  Supportive Measures:   active listening utilized   counseling provided   decision-making supported   goal-setting facilitated   verbalization of feelings encouraged   self-responsibility promoted   self-reflection promoted   mindfulness techniques promoted   self-care encouraged   relaxation techniques promoted  Goal: Develops/Participates in Therapeutic Williams to Support Successful Transition  Outcome: Progressing  Intervention: Foster Therapeutic Williams  Flowsheets (Taken 10/13/2024 2100 by Miguelina Pitt RN)  Trust Relationship/Rapport:   care explained   choices provided   empathic listening provided   emotional  support provided   questions answered   questions encouraged   reassurance provided   thoughts/feelings acknowledged  Intervention: Mutually Develop Transition Plan  Flowsheets  Taken 10/12/2024 1047 by Pura Bernal CSW  Outpatient/Agency/Support Group Needs: residential services  Discharge Coordination/Progress: Patient has [place] Medicaid. Therapist met with patient to complete assessment.  Transition Support:   follow-up care discussed   community resources reviewed   crisis management plan promoted   crisis management plan verbalized   follow-up care coordinated  Anticipated Discharge Disposition: residential substance use unit  Current Discharge Risk:   homeless   substance use/abuse   psychiatric illness  Concerns to be Addressed:   substance/tobacco abuse/use   coping/stress   cognitive/perceptual   homelessness   discharge planning   mental health  Readmission Within the Last 30 Days: no previous admission in last 30 days  Patient's Choice of Community Agency(s): To be determined  Offered/Gave Vendor List: yes  Taken 10/11/2024 1604 by Cahty Adan RN  Transportation Anticipated:   agency   family or friend will provide  Transportation Concerns: no car  Patient/Family Anticipated Services at Transition:      mental health services   outpatient care   rehabilitation services  Patient/Family Anticipates Transition to: inpatient rehabilitation facility      DATA:  Therapist met with Patient individually this date. Patient agreeable to discuss current treatment progress and discharge concerns.     CLINICAL MANUVERING/INTERVENTIONS:    Assisted patient in processing session content; acknowledged and normalized patient’s thoughts, feelings, and concerns by utilizing a person-centered approach in efforts to build appropriate rapport and a positive therapeutic relationship with open and honest communication. Allowed patient to ventilate regarding current stressors and triggers for negative  emotions and thoughts in a safe nonjudgmental environment with unconditional positive regard, active listening skills, and empathy. Therapist implemented motivational interviewing techniques to assist patient with exploring personal growth and change and discussed distress tolerance skills, self soothing techniques, and applied cognitive behavioral strategies to facilitate identification of maladaptive patterns of thinking and behavior. Therapist assisted patient with identifying and implementing healthier coping strategies.     ASSESSMENT:  Patient continues to receive treatment for detox. He denies feeling depressed, reports high anxiety. Patient denies SI/HI/AVH. Patient experiencing stomach cramps, diarrhea, leg cramp, and diarrhea. Patient has been accepted by [facility] and plans to admit to [facility] at discharge. Agency to provide transportation.     PLAN:   Patient will continue stabilization. Patient will continue to receive services offered by Treatment Team.     Patient to admit to [facility] at discharge.

## 2024-10-14 NOTE — PLAN OF CARE
Goal Outcome Evaluation:  Plan of Care Reviewed With: patient  Plan of Care Reviewed With: patient  Patient Agreement with Plan of Care: agrees     Progress: improving  Outcome Evaluation: Pt calm and cooperative this shift. Reports mild bodyaches and restlessness, but reports feeling better overall. Denies SI/HI/AVH. No distress noted.

## 2024-10-14 NOTE — PROGRESS NOTES
"INPATIENT PSYCHIATRIC PROGRESS NOTE    Name:  Jabari Telles  :  1985  MRN:  [id number]  Visit Number:  [id number]  Length of stay:  3    SUBJECTIVE    CC/Focus of Exam: Opioid use    INTERVAL HISTORY:  The patient reports he is feeling better and the medications are helping with the withdrawals.   Depression rating 0/10  Anxiety rating 7/10  Sleep: Fair  Withdrawal sx: Stomach cramps, diarrhea, leg cramps, anxiety  Craving: 3/10    Review of Systems   Gastrointestinal:  Positive for abdominal pain and diarrhea.   Musculoskeletal:  Positive for myalgias.   Psychiatric/Behavioral:  The patient is nervous/anxious.        OBJECTIVE    Temp:  [97.8 °F (36.6 °C)-98.5 °F (36.9 °C)] 98.5 °F (36.9 °C)  Heart Rate:  [55-91] 55  Resp:  [16-20] 18  BP: (127-151)/(71-91) 130/75    MENTAL STATUS EXAM:  Appearance:Casually dressed, good hygeine.   Cooperation:Cooperative  Psychomotor: No psychomotor agitation/retardation, No EPS, No motor tics  Speech-normal rate, amount.  Mood \"anxious\"   Affect-congruent, appropriate, stable  Thought Content-goal directed, no delusional material present  Thought process-linear, organized.  Suicidality: No SI  Homicidality: No HI  Perception: No AH/VH  Insight-fair   Judgement-fair    Lab Results (last 24 hours)       ** No results found for the last 24 hours. **               Imaging Results (Last 24 Hours)       ** No results found for the last 24 hours. **               ECG/EMG Results (most recent)       Procedure Component Value Units Date/Time    ECG 12 Lead Other; Baseline Cardiac Status [[id number]] Collected: 10/11/24 1724     Updated: 10/12/24 1408     QT Interval 412 ms      QTC Interval 412 ms     Narrative:      Test Reason : Other~  Blood Pressure :   */*   mmHG  Vent. Rate :  60 BPM     Atrial Rate :  60 BPM     P-R Int : 136 ms          QRS Dur : 100 ms      QT Int : 412 ms       P-R-T Axes :  37  49  63 degrees     QTc Int : 412 ms    Normal sinus rhythm  Normal ECG  When " compared with ECG of 2024 12:49,  No significant change was found  Confirmed by Marlin Amaya () on 10/12/2024 1:57:38 PM    Referred By:            Confirmed By: Marlin Amaya             ALLERGIES: Penicillins and Benadryl [diphenhydramine]    Medication Review:   Scheduled Medications:  buprenorphine, 2 mg, Sublingual, TID   Followed by  buprenorphine, 2 mg, Sublingual, BID   Followed by  [START ON 10/16/2024] buprenorphine, 2 mg, Sublingual, Daily  gabapentin, 600 mg, Oral, Q8H  levothyroxine, 50 mcg, Oral, Daily  lisinopril, 10 mg, Oral, Daily  pantoprazole, 40 mg, Oral, Daily         PRN Medications:    acetaminophen    aluminum-magnesium hydroxide-simethicone    benzonatate    benztropine **OR** benztropine    [] cloNIDine **FOLLOWED BY** [] cloNIDine **FOLLOWED BY** cloNIDine **FOLLOWED BY** [START ON 10/15/2024] cloNIDine    cyclobenzaprine    dicyclomine    famotidine    hydrALAZINE    hydrOXYzine    ibuprofen    loperamide    magnesium hydroxide    ondansetron ODT    polyethylene glycol    sodium chloride    traZODone   All medications reviewed.    ASSESSMENT & PLAN:    Opiate Use Disorder, Severe  Opiate withdrawal  - Clonidine detox protocol with comfort meds.  - Subutex detox protocol     H/o IV drug use  - Screen for hepatitis pending      Hypertension  - lisinopril     H/o hypothyroidism  - continue synthroid  - TSH pending     Special precautions: Special Precautions Level 4 (q30 min checks).    Behavioral Health Treatment Plan and Problem List: I have reviewed and approved the Behavioral Health Treatment Plan and Problem list.  The patient has had a chance to review and agrees with the treatment plan.    Copied text in portions of this note has been reviewed and is accurate as of 10/14/24         Clinician:  Barbi Marie MD  10/14/24  12:43 EDT

## 2024-10-14 NOTE — PROGRESS NOTES
Navigator is helping with the following referrals:    [facility] [phone]  -No answer.  10/14    [facility] [phone]  -Transferred call so patient could complete phone screening. 10/14  -Patient approved. They will plan to pick patient up tomorrow around 12:00pm.  Patient will be going to [facility].  10/14

## 2024-10-14 NOTE — PLAN OF CARE
Goal Outcome Evaluation:  Plan of Care Reviewed With: patient  Plan of Care Reviewed With: patient  Patient Agreement with Plan of Care: agrees     Progress: no change  Outcome Evaluation: Pt rated anxiety 10/10, depression 0/10, and cravings 8/10. Pt denies SI/HI/AVH. Pt was given prn atarax, ibuprofen, and trazodone. Pt did not voice any other concerns at this time. Pt reported poor appetite and sleep.

## 2024-10-15 VITALS
SYSTOLIC BLOOD PRESSURE: 122 MMHG | RESPIRATION RATE: 20 BRPM | TEMPERATURE: 97 F | HEART RATE: 72 BPM | HEIGHT: 71 IN | DIASTOLIC BLOOD PRESSURE: 71 MMHG | BODY MASS INDEX: 26.04 KG/M2 | WEIGHT: 186 LBS | OXYGEN SATURATION: 100 %

## 2024-10-15 PROCEDURE — 99238 HOSP IP/OBS DSCHRG MGMT 30/<: CPT | Performed by: PSYCHIATRY & NEUROLOGY

## 2024-10-15 RX ORDER — LEVOTHYROXINE SODIUM 50 UG/1
50 TABLET ORAL DAILY
Qty: 30 TABLET | Refills: 0 | Status: SHIPPED | OUTPATIENT
Start: 2024-10-15

## 2024-10-15 RX ORDER — PANTOPRAZOLE SODIUM 40 MG/1
40 TABLET, DELAYED RELEASE ORAL DAILY
Qty: 30 TABLET | Refills: 0 | Status: SHIPPED | OUTPATIENT
Start: 2024-10-15

## 2024-10-15 RX ORDER — LISINOPRIL 10 MG/1
10 TABLET ORAL DAILY
Qty: 30 TABLET | Refills: 0 | Status: SHIPPED | OUTPATIENT
Start: 2024-10-15

## 2024-10-15 RX ADMIN — PANTOPRAZOLE SODIUM 40 MG: 40 TABLET, DELAYED RELEASE ORAL at 08:47

## 2024-10-15 RX ADMIN — LISINOPRIL 10 MG: 10 TABLET ORAL at 08:47

## 2024-10-15 RX ADMIN — GABAPENTIN 600 MG: 300 CAPSULE ORAL at 06:26

## 2024-10-15 RX ADMIN — LEVOTHYROXINE SODIUM 50 MCG: 0.03 TABLET ORAL at 08:47

## 2024-10-15 RX ADMIN — BUPRENORPHINE HCL 2 MG: 2 TABLET SUBLINGUAL at 08:47

## 2024-10-15 NOTE — PLAN OF CARE
Problem: Adult Behavioral Health Plan of Care  Goal: Plan of Care Review  Recent Flowsheet Documentation  Taken 10/15/2024 1155 by Enid Hester, RN  Patient Agreement with Plan of Care: agrees  Plan of Care Reviewed With: patient  Taken 10/15/2024 0849 by Enid Hester RN  Patient Agreement with Plan of Care: agrees  Goal: Adheres to Safety Considerations for Self and Others  Intervention: Develop and Maintain Individualized Safety Plan  Recent Flowsheet Documentation  Taken 10/15/2024 0800 by Enid Hester, RN  Safety Measures:   environmental rounds completed   safety plan reviewed   safety rounds completed  Goal: Absence of New-Onset Illness or Injury  Intervention: Identify and Manage Fall Risk  Recent Flowsheet Documentation  Taken 10/15/2024 0800 by Enid Hester RN  Safety Measures:   environmental rounds completed   safety plan reviewed   safety rounds completed  Goal: Optimized Coping Skills in Response to Life Stressors  Intervention: Promote Effective Coping Strategies  Recent Flowsheet Documentation  Taken 10/15/2024 0849 by Enid Hester RN  Supportive Measures: active listening utilized  Goal: Develops/Participates in Therapeutic [place] to Support Successful Transition  Intervention: Foster Therapeutic [place]  Recent Flowsheet Documentation  Taken 10/15/2024 0849 by Enid Hester RN  Trust Relationship/Rapport:   care explained   questions encouraged   reassurance provided   Goal Outcome Evaluation:  Plan of Care Reviewed With: patient  Plan of Care Reviewed With: patient  Patient Agreement with Plan of Care: agrees

## 2024-10-15 NOTE — PLAN OF CARE
Goal Outcome Evaluation:  Plan of Care Reviewed With: patient  Plan of Care Reviewed With: patient  Patient Agreement with Plan of Care: agrees     Progress: improving     Pt reports fair sleep and good appetite. Pt rates anx 7/10 and dep 0/10. Pt denies SI/HI/AVH. Pt denies cravings or withdrawal symptoms.

## 2024-10-15 NOTE — CASE MANAGEMENT/SOCIAL WORK
Patient Name:  Jabari Telles  YOB: 1985  MRN: [id number]  Admit Date:  10/11/2024    Patient expected to discharge to [facility] on this date, agency providing transportation around 12:00. Patient reports improvement in mood and withdrawal symptoms, denies SI/HI/AVH. Healthy coping skills and relapse prevention have been reviewed. Patient has been assisted with identifying risk factors which would indicate the need for higher level of care including thoughts to harm self or others and/or self-harming behavior. Encouraged patient to notify [facility] staff, call [phone] or present to the nearest emergency room should any of these events occur. No other needs identified.     Electronically signed by:  DEONNA Cordon  10/15/24 09:30 EDT

## 2024-10-15 NOTE — DISCHARGE SUMMARY
":  1985  MRN:  [id number]  Visit Number:  [id number]      Date of Admission:10/11/2024   Date of Discharge:  10/15/2024    Discharge Diagnosis:  Active Problems:    Opioid use disorder, severe, dependence    Methamphetamine use disorder, severe    HTN    Hypothyroidism      Admission Diagnosis:  Opiate addiction [F11.20]     AHSLEY Telles is a 39 y.o. male who was admitted on 10- and evaluated on 10- with complaints of drug use and withdrawals.   For details please see H&P dated 10/12/24.     Hospital Course  Patient is a 39 y.o. male presented with opioid and methamphetamine use and withdrawals. The patient was admitted to the [facility] detox recovery unit for safety, further evaluation and treatment.  The patient was started on clonidine and Subutex detox and he was able to complete it without any problems.   The patient was also able to take part in individual and group counseling sessions and work on appropriate coping skills.  The patient made steady improvement in his withdrawals and mood and expressed feeling more positive and hopeful about future. Sleep and appetite were improved.  The day of discharge the patient was calm, cooperative and pleasant. Mood was reported to be good, and denied SI/HI/AVH. Also reported no medication side effects.        Mental Status Exam upon discharge:   Mood \"good\"   Affect-congruent, appropriate, stable  Thought Content-goal directed, no delusional material present  Thought process-linear, organized.  Suicidality: No SI  Homicidality: No HI  Perception: No AH/VH    Procedures Performed         Consults:   Consults       No orders found from 2024 to 10/12/2024.            Pertinent Test Results:   Admission on 10/11/2024   Component Date Value Ref Range Status    QT Interval 10/11/2024 412  ms Final    QTC Interval 10/11/2024 412  ms Final   Admission on 10/11/2024, Discharged on 10/11/2024   Component Date Value Ref Range Status    Glucose " 10/11/2024 166 (H)  65 - 99 mg/dL Final    BUN 10/11/2024 8  6 - 20 mg/dL Final    Creatinine 10/11/2024 0.90  0.76 - 1.27 mg/dL Final    Sodium 10/11/2024 135 (L)  136 - 145 mmol/L Final    Potassium 10/11/2024 3.4 (L)  3.5 - 5.2 mmol/L Final    Slight hemolysis detected by analyzer. Result may be falsely elevated.    Chloride 10/11/2024 99  98 - 107 mmol/L Final    CO2 10/11/2024 23.1  22.0 - 29.0 mmol/L Final    Calcium 10/11/2024 8.6  8.6 - 10.5 mg/dL Final    Total Protein 10/11/2024 6.5  6.0 - 8.5 g/dL Final    Albumin 10/11/2024 3.4 (L)  3.5 - 5.2 g/dL Final    ALT (SGPT) 10/11/2024 87 (H)  1 - 41 U/L Final    AST (SGOT) 10/11/2024 91 (H)  1 - 40 U/L Final    Alkaline Phosphatase 10/11/2024 93  39 - 117 U/L Final    Total Bilirubin 10/11/2024 0.6  0.0 - 1.2 mg/dL Final    Globulin 10/11/2024 3.1  gm/dL Final    A/G Ratio 10/11/2024 1.1  g/dL Final    BUN/Creatinine Ratio 10/11/2024 8.9  7.0 - 25.0 Final    Anion Gap 10/11/2024 12.9  5.0 - 15.0 mmol/L Final    eGFR 10/11/2024 110.7  >60.0 mL/min/1.73 Final    Color, UA 10/11/2024 Yellow  Yellow, Straw Final    Appearance, UA 10/11/2024 Clear  Clear Final    pH, UA 10/11/2024 6.0  5.0 - 8.0 Final    Specific Gravity, UA 10/11/2024 1.016  1.005 - 1.030 Final    Glucose, UA 10/11/2024 Negative  Negative Final    Ketones, UA 10/11/2024 Negative  Negative Final    Bilirubin, UA 10/11/2024 Negative  Negative Final    Blood, UA 10/11/2024 Negative  Negative Final    Protein, UA 10/11/2024 Negative  Negative Final    Leuk Esterase, UA 10/11/2024 Negative  Negative Final    Nitrite, UA 10/11/2024 Negative  Negative Final    Urobilinogen, UA 10/11/2024 1.0 E.U./dL  0.2 - 1.0 E.U./dL Final    Ethanol 10/11/2024 <10  0 - 10 mg/dL Final    Ethanol % 10/11/2024 <0.010  % Final    THC, Screen, Urine 10/11/2024 Positive (A)  Negative Final    Phencyclidine (PCP), Urine 10/11/2024 Negative  Negative Final    Cocaine Screen, Urine 10/11/2024 Negative  Negative Final     Methamphetamine, Ur 10/11/2024 Positive (A)  Negative Final    Opiate Screen 10/11/2024 Negative  Negative Final    Amphetamine Screen, Urine 10/11/2024 Positive (A)  Negative Final    Benzodiazepine Screen, Urine 10/11/2024 Negative  Negative Final    Tricyclic Antidepressants Screen 10/11/2024 Negative  Negative Final    Methadone Screen, Urine 10/11/2024 Negative  Negative Final    Barbiturates Screen, Urine 10/11/2024 Negative  Negative Final    Oxycodone Screen, Urine 10/11/2024 Negative  Negative Final    Buprenorphine, Screen, Urine 10/11/2024 Positive (A)  Negative Final    Magnesium 10/11/2024 2.2  1.6 - 2.6 mg/dL Final    WBC 10/11/2024 6.23  3.40 - 10.80 10*3/mm3 Final    RBC 10/11/2024 5.56  4.14 - 5.80 10*6/mm3 Final    Hemoglobin 10/11/2024 15.8  13.0 - 17.7 g/dL Final    Hematocrit 10/11/2024 48.0  37.5 - 51.0 % Final    MCV 10/11/2024 86.3  79.0 - 97.0 fL Final    MCH 10/11/2024 28.4  26.6 - 33.0 pg Final    MCHC 10/11/2024 32.9  31.5 - 35.7 g/dL Final    RDW 10/11/2024 13.6  12.3 - 15.4 % Final    RDW-SD 10/11/2024 43.8  37.0 - 54.0 fl Final    MPV 10/11/2024 9.7  6.0 - 12.0 fL Final    Platelets 10/11/2024 271  140 - 450 10*3/mm3 Final    Neutrophil % 10/11/2024 65.9  42.7 - 76.0 % Final    Lymphocyte % 10/11/2024 21.7  19.6 - 45.3 % Final    Monocyte % 10/11/2024 10.0  5.0 - 12.0 % Final    Eosinophil % 10/11/2024 1.4  0.3 - 6.2 % Final    Basophil % 10/11/2024 0.8  0.0 - 1.5 % Final    Immature Grans % 10/11/2024 0.2  0.0 - 0.5 % Final    Neutrophils, Absolute 10/11/2024 4.11  1.70 - 7.00 10*3/mm3 Final    Lymphocytes, Absolute 10/11/2024 1.35  0.70 - 3.10 10*3/mm3 Final    Monocytes, Absolute 10/11/2024 0.62  0.10 - 0.90 10*3/mm3 Final    Eosinophils, Absolute 10/11/2024 0.09  0.00 - 0.40 10*3/mm3 Final    Basophils, Absolute 10/11/2024 0.05  0.00 - 0.20 10*3/mm3 Final    Immature Grans, Absolute 10/11/2024 0.01  0.00 - 0.05 10*3/mm3 Final    nRBC 10/11/2024 0.0  0.0 - 0.2 /100 WBC Final     Fentanyl, Urine 10/11/2024 Positive (A)  Negative Final        Condition on Discharge:  improved    Vital Signs  Temp:  [97.6 °F (36.4 °C)-97.9 °F (36.6 °C)] 97.6 °F (36.4 °C)  Heart Rate:  [56-92] 56  Resp:  [16-18] 16  BP: (116-160)/(64-94) 116/65      Discharge Disposition:  Rehab Facility or Unit (DC - External)    Discharge Medications:     Discharge Medications        Continue These Medications        Instructions Start Date   levothyroxine 50 MCG tablet  Commonly known as: SYNTHROID, LEVOTHROID   50 mcg, Oral, Daily      lisinopril 10 MG tablet  Commonly known as: PRINIVIL,ZESTRIL   10 mg, Oral, Daily      pantoprazole 40 MG EC tablet  Commonly known as: PROTONIX   40 mg, Oral, Daily             Stop These Medications      buprenorphine-naloxone 8-2 MG per SL tablet  Commonly known as: SUBOXONE     gabapentin 600 MG tablet  Commonly known as: NEURONTIN              Discharge Diet: Regular     Activity at Discharge: As tolerated     Follow-up Appointments  [facility]      Time: I spent  < 30  minutes on this discharge activity which included: face-to-face encounter with the patient, reviewing the data in the system, coordination of the care with the nursing staff as well as consultants, documentation, and entering orders.        Clinician:   Barbi Marie MD  10/15/24  11:51 EDT